# Patient Record
Sex: MALE | Race: ASIAN | Employment: OTHER | URBAN - METROPOLITAN AREA
[De-identification: names, ages, dates, MRNs, and addresses within clinical notes are randomized per-mention and may not be internally consistent; named-entity substitution may affect disease eponyms.]

---

## 2017-03-08 ENCOUNTER — APPOINTMENT (OUTPATIENT)
Dept: CT IMAGING | Age: 62
End: 2017-03-08
Attending: PHYSICIAN ASSISTANT
Payer: COMMERCIAL

## 2017-03-08 ENCOUNTER — HOSPITAL ENCOUNTER (EMERGENCY)
Age: 62
Discharge: HOME | End: 2017-03-08
Attending: EMERGENCY MEDICINE
Payer: COMMERCIAL

## 2017-03-08 VITALS
SYSTOLIC BLOOD PRESSURE: 120 MMHG | HEART RATE: 78 BPM | RESPIRATION RATE: 18 BRPM | OXYGEN SATURATION: 98 % | DIASTOLIC BLOOD PRESSURE: 68 MMHG | HEIGHT: 68 IN | BODY MASS INDEX: 27.13 KG/M2 | TEMPERATURE: 97.8 F | WEIGHT: 179 LBS

## 2017-03-08 DIAGNOSIS — N30.90 CYSTITIS: Primary | ICD-10-CM

## 2017-03-08 LAB
ALBUMIN SERPL BCP-MCNC: 3.8 G/DL (ref 3.4–5)
ALBUMIN/GLOB SERPL: 0.9 {RATIO} (ref 0.8–1.7)
ALP SERPL-CCNC: 76 U/L (ref 45–117)
ALT SERPL-CCNC: 104 U/L (ref 16–61)
ANION GAP BLD CALC-SCNC: 7 MMOL/L (ref 3–18)
APPEARANCE UR: ABNORMAL
AST SERPL W P-5'-P-CCNC: 60 U/L (ref 15–37)
BACTERIA URNS QL MICRO: ABNORMAL /HPF
BASOPHILS # BLD AUTO: 0.1 K/UL (ref 0–0.06)
BASOPHILS # BLD: 1 % (ref 0–2)
BILIRUB SERPL-MCNC: 0.6 MG/DL (ref 0.2–1)
BILIRUB UR QL: ABNORMAL
BUN SERPL-MCNC: 9 MG/DL (ref 7–18)
BUN/CREAT SERPL: 7 (ref 12–20)
CALCIUM SERPL-MCNC: 9.5 MG/DL (ref 8.5–10.1)
CHLORIDE SERPL-SCNC: 86 MMOL/L (ref 100–108)
CO2 SERPL-SCNC: 34 MMOL/L (ref 21–32)
COLOR UR: ABNORMAL
CREAT SERPL-MCNC: 1.22 MG/DL (ref 0.6–1.3)
DIFFERENTIAL METHOD BLD: ABNORMAL
EOSINOPHIL # BLD: 0.2 K/UL (ref 0–0.4)
EOSINOPHIL NFR BLD: 2 % (ref 0–5)
EPITH CASTS URNS QL MICRO: ABNORMAL /LPF (ref 0–5)
ERYTHROCYTE [DISTWIDTH] IN BLOOD BY AUTOMATED COUNT: 12.7 % (ref 11.6–14.5)
GLOBULIN SER CALC-MCNC: 4.3 G/DL (ref 2–4)
GLUCOSE SERPL-MCNC: 274 MG/DL (ref 74–99)
GLUCOSE UR STRIP.AUTO-MCNC: >1000 MG/DL
HCT VFR BLD AUTO: 41.6 % (ref 36–48)
HGB BLD-MCNC: 14.5 G/DL (ref 13–16)
HGB UR QL STRIP: ABNORMAL
KETONES UR QL STRIP.AUTO: NEGATIVE MG/DL
LEUKOCYTE ESTERASE UR QL STRIP.AUTO: ABNORMAL
LYMPHOCYTES # BLD AUTO: 19 % (ref 21–52)
LYMPHOCYTES # BLD: 2.2 K/UL (ref 0.9–3.6)
MCH RBC QN AUTO: 30.9 PG (ref 24–34)
MCHC RBC AUTO-ENTMCNC: 34.9 G/DL (ref 31–37)
MCV RBC AUTO: 88.7 FL (ref 74–97)
MONOCYTES # BLD: 1.3 K/UL (ref 0.05–1.2)
MONOCYTES NFR BLD AUTO: 12 % (ref 3–10)
NEUTS SEG # BLD: 7.7 K/UL (ref 1.8–8)
NEUTS SEG NFR BLD AUTO: 66 % (ref 40–73)
NITRITE UR QL STRIP.AUTO: NEGATIVE
PH UR STRIP: 6.5 [PH] (ref 5–8)
PLATELET # BLD AUTO: 245 K/UL (ref 135–420)
PMV BLD AUTO: 9.7 FL (ref 9.2–11.8)
POTASSIUM SERPL-SCNC: 3.7 MMOL/L (ref 3.5–5.5)
PROT SERPL-MCNC: 8.1 G/DL (ref 6.4–8.2)
PROT UR STRIP-MCNC: 300 MG/DL
RBC # BLD AUTO: 4.69 M/UL (ref 4.7–5.5)
RBC #/AREA URNS HPF: ABNORMAL /HPF (ref 0–5)
SODIUM SERPL-SCNC: 127 MMOL/L (ref 136–145)
SP GR UR REFRACTOMETRY: 1.02 (ref 1–1.03)
UROBILINOGEN UR QL STRIP.AUTO: 1 EU/DL (ref 0.2–1)
WBC # BLD AUTO: 11.4 K/UL (ref 4.6–13.2)
WBC URNS QL MICRO: ABNORMAL /HPF (ref 0–4)

## 2017-03-08 PROCEDURE — 99283 EMERGENCY DEPT VISIT LOW MDM: CPT

## 2017-03-08 PROCEDURE — 74176 CT ABD & PELVIS W/O CONTRAST: CPT

## 2017-03-08 PROCEDURE — 80053 COMPREHEN METABOLIC PANEL: CPT | Performed by: PHYSICIAN ASSISTANT

## 2017-03-08 PROCEDURE — 81001 URINALYSIS AUTO W/SCOPE: CPT | Performed by: EMERGENCY MEDICINE

## 2017-03-08 PROCEDURE — 85025 COMPLETE CBC W/AUTO DIFF WBC: CPT | Performed by: PHYSICIAN ASSISTANT

## 2017-03-08 RX ORDER — LEVOFLOXACIN 750 MG/1
750 TABLET ORAL DAILY
Qty: 7 TAB | Refills: 0 | Status: SHIPPED | OUTPATIENT
Start: 2017-03-08 | End: 2017-03-15

## 2017-03-08 RX ORDER — HYDROCODONE BITARTRATE AND ACETAMINOPHEN 5; 325 MG/1; MG/1
1 TABLET ORAL
Qty: 20 TAB | Refills: 0 | Status: SHIPPED | OUTPATIENT
Start: 2017-03-08 | End: 2017-03-08

## 2017-03-08 RX ORDER — LEVOFLOXACIN 750 MG/1
750 TABLET ORAL DAILY
Qty: 7 TAB | Refills: 0 | Status: SHIPPED | OUTPATIENT
Start: 2017-03-08 | End: 2017-03-08

## 2017-03-08 NOTE — ED NOTES
Current Discharge Medication List      START taking these medications    Details   levoFLOXacin (LEVAQUIN) 750 mg tablet Take 1 Tab by mouth daily for 7 days. Qty: 7 Tab, Refills: 0         CONTINUE these medications which have NOT CHANGED    Details   tamsulosin (FLOMAX) 0.4 mg capsule Refills: 0      VITAMIN D2 50,000 unit capsule Refills: 0      ibuprofen (MOTRIN) 800 mg tablet Refills: 0      indapamide (LOZOL) 2.5 mg tablet Refills: 0      LEVEMIR FLEXTOUCH 100 unit/mL (3 mL) inpn Refills: 0      metFORMIN SR (FORTAMET) tablet Refills: 0      folic acid-vit K2-JOSE W81 2.5-25-1 mg tablet Take 1 Tab by mouth daily. amLODIPine (NORVASC) 10 mg tablet Take  by mouth daily. insulin lispro (HUMALOG) 100 unit/mL kwikpen by SubCUTAneous route. aspirin 81 mg chewable tablet Take 81 mg by mouth daily. olmesartan (BENICAR) 40 mg tablet Take 40 mg by mouth daily. MULTIVITAMIN PO Take  by mouth. DOCOSAHEXANOIC ACID/EPA (FISH OIL PO) Take  by mouth.            Patient armband removed and shredded  Prescription given and reviewed with patient

## 2017-03-08 NOTE — DISCHARGE INSTRUCTIONS

## 2017-03-08 NOTE — ED PROVIDER NOTES
HPI Comments: Pt is 60yo male presenting to ER with dysuria x5 days. States pain is only present while urinating. No pain before or after urinating. Denies abdominal pain, hematuria, back pain. Denies fever, chills, NVD, penile discharge. Hx of similar in past, states symptoms improved with abx use. Khanh Ryaa MD PCP. Past Medical History:  No date: Allergic rhinitis  No date: Atherosclerotic PVD with intermittent claudica*  No date: Chest pain, unspecified  No date: Diabetes mellitus type 2, uncontrolled, withou*  No date: Essential hypertension, benign  No date: Hyperlipidemia  No date: Vitamin D deficiency     Patient is a 60 y.o. male presenting with urinary pain and abdominal pain. The history is provided by the patient. Urinary Pain    Associated symptoms include abdominal pain. Pertinent negatives include no chills, no nausea, no vomiting, no frequency, no hematuria, no urgency, no flank pain and no back pain. Abdominal Pain    Associated symptoms include dysuria. Pertinent negatives include no fever, no diarrhea, no nausea, no vomiting, no frequency, no hematuria, no headaches, no arthralgias, no testicular pain and no back pain. Past Medical History:   Diagnosis Date    Allergic rhinitis     Atherosclerotic PVD with intermittent claudication (HCC)     Chest pain, unspecified     Diabetes mellitus type 2, uncontrolled, without complications (HCC)     Essential hypertension, benign     Hyperlipidemia     Vitamin D deficiency        History reviewed. No pertinent surgical history. Family History:   Problem Relation Age of Onset    Heart Disease Mother        Social History     Social History    Marital status:      Spouse name: N/A    Number of children: N/A    Years of education: N/A     Occupational History    Not on file.      Social History Main Topics    Smoking status: Never Smoker    Smokeless tobacco: Never Used    Alcohol use Yes      Comment: occaisonally    Drug use: No    Sexual activity: Not Currently     Other Topics Concern    Not on file     Social History Narrative    ** Merged History Encounter **              ALLERGIES: Review of patient's allergies indicates no known allergies. Review of Systems   Constitutional: Negative for chills and fever. Eyes: Negative. Gastrointestinal: Positive for abdominal pain. Negative for diarrhea, nausea and vomiting. Endocrine: Negative. Genitourinary: Positive for dysuria. Negative for discharge, flank pain, frequency, hematuria, penile pain, penile swelling, scrotal swelling, testicular pain and urgency. Musculoskeletal: Negative for arthralgias and back pain. Neurological: Negative for dizziness, weakness, numbness and headaches. Vitals:    03/08/17 1447   BP: 120/68   Pulse: 78   Resp: 18   Temp: 97.8 °F (36.6 °C)   SpO2: 98%   Weight: 81.2 kg (179 lb)   Height: 5' 8\" (1.727 m)            Physical Exam   Constitutional: He is oriented to person, place, and time. He appears well-developed and well-nourished. No distress. HENT:   Head: Normocephalic and atraumatic. Mouth/Throat: Oropharynx is clear and moist.   Eyes: Conjunctivae are normal.   Neck: Normal range of motion. Cardiovascular: Normal rate, regular rhythm and normal heart sounds. Pulmonary/Chest: Effort normal. No respiratory distress. He has no wheezes. He has no rales. He exhibits no tenderness. Abdominal: Soft. Bowel sounds are normal. He exhibits no distension and no mass. There is no tenderness. There is no rebound and no guarding. Mild suprapubic tenderness   Genitourinary: Penis normal.   Neurological: He is oriented to person, place, and time. No cranial nerve deficit. Coordination normal.   Skin: Skin is warm and dry. He is not diaphoretic. Nursing note and vitals reviewed.        MDM  Number of Diagnoses or Management Options  Cystitis:   Diagnosis management comments: UA: large amount of blood, moderate leuks, 11-20 WBC,     CT abdomen/pelvis:   Apparent eccentric abnormal urinary bladder wall thickening on the right  inferiorly. Differential diagnosis of cystitis vs. bladder wall carcinoma. Further evaluation would be recommended. 1 mm right intrarenal nonobstructing calculus. Small umbilical hernia contains only fat. Atherosclerosis. Hepatic steatosis with top normal hepatic size. Labs reasurring    Rechecked the patient and updated him on all current results. Based upon the patients presentation with noted HPI and PE, along with the work up done in the emergency department today, I believe the patient's symptoms are a result of cystitis. Will Rx for levaquin as he has contraindication for bactrim and another daily med. Pt instructed to f/u with PCP within 1 week and return to ED if worse or as needed.       ED Course       Procedures

## 2017-09-21 ENCOUNTER — OFFICE VISIT (OUTPATIENT)
Dept: CARDIOLOGY CLINIC | Age: 62
End: 2017-09-21

## 2017-09-21 VITALS
HEIGHT: 68 IN | HEART RATE: 69 BPM | WEIGHT: 191 LBS | BODY MASS INDEX: 28.95 KG/M2 | SYSTOLIC BLOOD PRESSURE: 109 MMHG | DIASTOLIC BLOOD PRESSURE: 56 MMHG

## 2017-09-21 DIAGNOSIS — Z79.4 TYPE 2 DIABETES MELLITUS WITHOUT COMPLICATION, WITH LONG-TERM CURRENT USE OF INSULIN (HCC): ICD-10-CM

## 2017-09-21 DIAGNOSIS — R07.9 CHEST PAIN, UNSPECIFIED TYPE: Primary | ICD-10-CM

## 2017-09-21 DIAGNOSIS — E11.9 TYPE 2 DIABETES MELLITUS WITHOUT COMPLICATION, WITH LONG-TERM CURRENT USE OF INSULIN (HCC): ICD-10-CM

## 2017-09-21 DIAGNOSIS — I10 ESSENTIAL HYPERTENSION: ICD-10-CM

## 2017-09-21 DIAGNOSIS — R42 DIZZINESS: ICD-10-CM

## 2017-09-21 DIAGNOSIS — R06.02 SOB (SHORTNESS OF BREATH) ON EXERTION: ICD-10-CM

## 2017-09-21 RX ORDER — MULTIVITAMIN
1000 TABLET ORAL
COMMUNITY
End: 2020-05-21

## 2017-09-21 NOTE — LETTER
Boni Holguin 1955 9/21/2017 Dear Mica Antonio MD 
 
I had the pleasure of evaluating  Mr. Holley Palomino in office today. Below are the relevant portions of my assessment and plan of care. ICD-10-CM ICD-9-CM 1. Chest pain, unspecified type R07.9 786.50 SCHEDULE NUCLEAR STUDY  
   2D ECHO COMPLETE ADULT (TTE)  
 possible angina,chest wall 2. Essential hypertension I10 401.9 SCHEDULE NUCLEAR STUDY  
   2D ECHO COMPLETE ADULT (TTE) 3. Type 2 diabetes mellitus without complication, with long-term current use of insulin (HCC) E11.9 250.00   
 Z79.4 V58.67   
4. SOB (shortness of breath) on exertion R06.02 786.05   
 ? hcvd,chf  
5. Dizziness R42 780.4   
 likely postural hypotension Likely from diabetic neuropathy Current Outpatient Prescriptions Medication Sig Dispense Refill  cinnamon bark (CINNAMON) 500 mg cap Take 1,000 mg by mouth.  tamsulosin (FLOMAX) 0.4 mg capsule 0.4 mg nightly. 0  
 VITAMIN D2 50,000 unit capsule   0  
 ibuprofen (MOTRIN) 800 mg tablet   0  
 indapamide (LOZOL) 2.5 mg tablet 2.5 mg daily. 0  
 LEVEMIR FLEXTOUCH 100 unit/mL (3 mL) inpn 4 Units daily. 0  
 metFORMIN SR (FORTAMET) tablet 1,000 mg two (2) times a day.  0  
 folic acid-vit Q3-MQV U71 2.5-25-1 mg tablet Take 1 Tab by mouth daily.  amLODIPine (NORVASC) 10 mg tablet Take 10 mg by mouth daily.  insulin lispro (HUMALOG) 100 unit/mL kwikpen 6 Units by SubCUTAneous route three (3) times daily.  aspirin 81 mg chewable tablet Take 81 mg by mouth daily.  olmesartan (BENICAR) 40 mg tablet Take 40 mg by mouth daily.  MULTIVITAMIN PO Take  by mouth.  DOCOSAHEXANOIC ACID/EPA (FISH OIL PO) Take  by mouth. Orders Placed This Encounter  SCHEDULE NUCLEAR STUDY Exercise stress test  
  Standing Status:   Future Standing Expiration Date:   9/21/2018  2D ECHO COMPLETE ADULT (TTE) Standing Status:   Future Standing Expiration Date:   3/20/2018 Order Specific Question:   Reason for Exam: Answer:   see diagnosis  cinnamon bark (CINNAMON) 500 mg cap Sig: Take 1,000 mg by mouth. If you have questions, please do not hesitate to call me. I look forward to following Mr. Yamila Rocha along with you. Sincerely, Sharla Jacinto MD

## 2017-09-21 NOTE — MR AVS SNAPSHOT
Visit Information Date & Time Provider Department Dept. Phone Encounter #  
 9/21/2017  9:15 AM Dorian Mccabe MD [facility] [address] [place] [address] [id number] Follow-up Instructions Return for F/u after tests. Your Appointments 9/27/2017  7:30 AM  
PROCEDURE with [place] NUC [facility] ([address]) Appt Note: figueroa with Echo if possible wt [address] [facility], [address] [facility] [address]  
[address], [address] [address]  
  
    
 10/20/2017  8:30 AM  
PROCEDURE with CA ECHO [facility] ([address]) Appt Note: with same day f/u  
 [address] [facility], [address] [place] [address]  
[address], [address] [address]  
  
    
 10/20/2017  1:45 PM  
ESTABLISHED PATIENT with Dorian Mccabe MD  
[facility] Appt Note: post nuc and echo [address] [facility], [address] [facility] [address]  
[address], [address] [address] Upcoming Health Maintenance Date Due Hepatitis C Screening 1955 DTaP/Tdap/Td series (1 - Tdap) 1/11/1975 FOBT Q 1 YEAR AGE 50-75 1/11/2004 ZOSTER VACCINE AGE 60> 11/11/2013 INFLUENZA AGE 9 TO ADULT 8/1/2017 Allergies as of 9/21/2017  Review Complete On: 9/21/2017 By: Dorian Mccabe MD  
 No Known Allergies Current Immunizations  Never Reviewed No immunizations on file. Not reviewed this visit You Were Diagnosed With   
  
 Codes Comments Chest pain, unspecified type    -  Primary ICD-10-CM: R07.9 ICD-9-CM: 786.50 possible angina,chest wall Essential hypertension     ICD-10-CM: I10 
ICD-9-CM: 401.9 Type 2 diabetes mellitus without complication, with long-term current use of insulin (HCC)     ICD-10-CM: E11.9, Z79.4 ICD-9-CM: 250.00, V58.67   
 SOB (shortness of breath) on exertion     ICD-10-CM: R06.02 
ICD-9-CM: 786.05 ? hcvd,chf  
 Dizziness     ICD-10-CM: D32 ICD-9-CM: 780.4 likely postural hypotension Likely from diabetic neuropathy Vitals BP Pulse Height(growth percentile) Weight(growth percentile) BMI Smoking Status 109/56 69 5' 8\" (1.727 m) 191 lb (86.6 kg) 29.04 kg/m2 Never Smoker Vitals History BMI and BSA Data Body Mass Index Body Surface Area 29.04 kg/m 2 2.04 m 2 Preferred Pharmacy Pharmacy Name Phone [facility] [address] , [facility] [place] [address] [phone] Your Updated Medication List  
  
   
This list is accurate as of: 9/21/17 10:16 AM.  Always use your most recent med list. amLODIPine 10 mg tablet Commonly known as:  Jacquetta Couch Take 10 mg by mouth daily. aspirin 81 mg chewable tablet Take 81 mg by mouth daily. BENICAR 40 mg tablet Generic drug:  olmesartan Take 40 mg by mouth daily. CINNAMON 500 mg Cap Generic drug:  cinnamon bark Take 1,000 mg by mouth. FISH OIL PO Take  by mouth. folic acid-vit X9-UCB I82 2.5-25-1 mg tablet Commonly known as:  Danney Taylor Take 1 Tab by mouth daily. ibuprofen 800 mg tablet Commonly known as:  MOTRIN  
  
 indapamide 2.5 mg tablet Commonly known as:  LOZOL  
2.5 mg daily. insulin lispro 100 unit/mL kwikpen Commonly known as:  HUMALOG  
6 Units by SubCUTAneous route three (3) times daily. LEVEMIR FLEXTOUCH 100 unit/mL (3 mL) Inpn Generic drug:  insulin detemir 4 Units daily. metFORMIN  mg  
Commonly known as:  FORTAMET  
1,000 mg two (2) times a day. MULTIVITAMIN PO Take  by mouth. tamsulosin 0.4 mg capsule Commonly known as:  FLOMAX  
0.4 mg nightly. VITAMIN D2 50,000 unit capsule Generic drug:  ergocalciferol Follow-up Instructions Return for F/u after tests. To-Do List   
 09/24/2017 Cardiac Services:  2D ECHO COMPLETE ADULT (TTE)   
  
 09/28/2017 Nursing:  SCHEDULE NUCLEAR STUDY Introducing [facility]! Namrata Kennedy introduces [employer] patient portal. Now you can access parts of your medical record, email your doctor's office, and request medication refills online. 1. In your internet browser, go to [email or web address] 2. Click on the First Time User? Click Here link in the Sign In box. You will see the New Member Sign Up page. 3. Enter your [employer] Access Code exactly as it appears below. You will not need to use this code after youve completed the sign-up process. If you do not sign up before the expiration date, you must request a new code. · [employer] Access Code: [email or web address] Expires: 12/20/2017  9:39 AM 
 
4. Enter the last four digits of your Social Security Number (xxxx) and Date of Birth (mm/dd/yyyy) as indicated and click Submit. You will be taken to the next sign-up page. 5. Create a [employer] ID. This will be your [employer] login ID and cannot be changed, so think of one that is secure and easy to remember. 6. Create a [employer] password. You can change your password at any time. 7. Enter your Password Reset Question and Answer. This can be used at a later time if you forget your password. 8. Enter your e-mail address. You will receive e-mail notification when new information is available in [address]. 9. Click Sign Up. You can now view and download portions of your medical record. 10. Click the Download Summary menu link to download a portable copy of your medical information. If you have questions, please visit the Frequently Asked Questions section of the [employer] website. Remember, [employer] is NOT to be used for urgent needs. For medical emergencies, dial 911. Now available from your iPhone and Android! Please provide this summary of care documentation to your next provider. Your primary care clinician is listed as [address] [place] [address]. If you have any questions after today's visit, please call [phone].

## 2017-09-21 NOTE — PROGRESS NOTES
HISTORY OF PRESENT ILLNESS  Nalini Zamora is a 60 y.o. male. New Patient   The history is provided by the patient. This is a new problem. Associated symptoms include chest pain and shortness of breath. Pertinent negatives include no abdominal pain and no headaches. Chest Pain    The history is provided by the patient. This is a recurrent problem. The problem has not changed since onset. The problem occurs daily. The pain is associated with exertion and rest. The pain is present in the substernal region. The pain is mild. The quality of the pain is described as pressure-like and dull. The pain does not radiate. Associated symptoms include shortness of breath. Pertinent negatives include no abdominal pain, no claudication, no cough, no dizziness, no fever, no headaches, no hemoptysis, no nausea, no orthopnea, no palpitations, no PND, no sputum production, no vomiting and no weakness. Shortness of Breath   The history is provided by the patient. This is a recurrent problem. The problem occurs intermittently. The problem has not changed since onset. Associated symptoms include chest pain. Pertinent negatives include no fever, no headaches, no cough, no sputum production, no hemoptysis, no wheezing, no PND, no orthopnea, no vomiting, no abdominal pain, no rash, no leg swelling and no claudication. The problem's precipitants include exercise. Review of Systems   Constitutional: Negative for chills and fever. HENT: Negative for nosebleeds. Eyes: Negative for blurred vision and double vision. Respiratory: Positive for shortness of breath. Negative for cough, hemoptysis, sputum production and wheezing. Cardiovascular: Positive for chest pain. Negative for palpitations, orthopnea, claudication, leg swelling and PND. Gastrointestinal: Negative for abdominal pain, heartburn, nausea and vomiting. Musculoskeletal: Negative for myalgias. Skin: Negative for rash.    Neurological: Negative for dizziness, weakness and headaches. Endo/Heme/Allergies: Does not bruise/bleed easily. Family History   Problem Relation Age of Onset    Heart Disease Mother        Past Medical History:   Diagnosis Date    Allergic rhinitis     Atherosclerotic PVD with intermittent claudication (HCC)     Chest pain, unspecified     Diabetes mellitus type 2, uncontrolled, without complications ([facility] [address]     Essential hypertension, benign     Hyperlipidemia     Phimosis/redundant prepuce     Vitamin D deficiency        Past Surgical History:   Procedure Laterality Date    HX UROLOGICAL  03/31/2017    tonkin, sngh, Circumcision. Social History   Substance Use Topics    Smoking status: Never Smoker    Smokeless tobacco: Never Used    Alcohol use Yes      Comment: occasionally       No Known Allergies    Prior to Admission medications    Medication Sig Start Date End Date Taking? Authorizing Provider   cinnamon bark (CINNAMON) 500 mg cap Take 1,000 mg by mouth. Yes Historical Provider   tamsulosin (FLOMAX) 0.4 mg capsule 0.4 mg nightly. 12/2/16  Yes Historical Provider   VITAMIN D2 50,000 unit capsule  1/24/17  Yes Historical Provider   ibuprofen (MOTRIN) 800 mg tablet  12/18/16  Yes Historical Provider   indapamide (LOZOL) 2.5 mg tablet 2.5 mg daily. 2/24/17  Yes Historical Provider   LEVEMIR FLEXTOUCH 100 unit/mL (3 mL) inpn 4 Units daily. 2/24/17  Yes Historical Provider   metFORMIN SR (FORTAMET) tablet 1,000 mg two (2) times a day. 1/16/17  Yes Historical Provider   folic acid-vit U3-RGU J28 2.5-25-1 mg tablet Take 1 Tab by mouth daily. Yes Historical Provider   amLODIPine (NORVASC) 10 mg tablet Take 10 mg by mouth daily. Yes Historical Provider   insulin lispro (HUMALOG) 100 unit/mL kwikpen 6 Units by SubCUTAneous route three (3) times daily. Yes Historical Provider   aspirin 81 mg chewable tablet Take 81 mg by mouth daily. Yes Lidia Martin MD   olmesartan (BENICAR) 40 mg tablet Take 40 mg by mouth daily.    Yes Lidia Martin MD   MULTIVITAMIN PO Take  by mouth. Yes Lidia Martin MD   DOCOSAHEXANOIC ACID/EPA (FISH OIL PO) Take  by mouth. Yes Lidia Martin MD         Visit Vitals    /56    Pulse 69    Ht 5' 8\" (1.727 m)    Wt 86.6 kg (191 lb)    BMI 29.04 kg/m2       Physical Exam   Constitutional: He is oriented to person, place, and time. He appears well-developed and well-nourished. HENT:   Head: Normocephalic and atraumatic. Eyes: Conjunctivae are normal.   Neck: Neck supple. No JVD present. No tracheal deviation present. No thyromegaly present. Cardiovascular: Normal rate, regular rhythm and normal heart sounds. Exam reveals no gallop and no friction rub. No murmur heard. Pulmonary/Chest: Breath sounds normal. No respiratory distress. He has no wheezes. He has no rales. He exhibits no tenderness. Abdominal: Soft. There is no tenderness. Musculoskeletal: He exhibits no edema. Neurological: He is alert and oriented to person, place, and time. Skin: Skin is warm and dry. Psychiatric: He has a normal mood and affect. Mr. Eliazar Ortega has a reminder for a \"due or due soon\" health maintenance. I have asked that he contact his primary care provider for follow-up on this health maintenance. Assessment         ICD-10-CM ICD-9-CM    1. Chest pain, unspecified type R07.9 786.50 SCHEDULE NUCLEAR STUDY      2D ECHO COMPLETE ADULT (TTE)    possible angina,chest wall   2. Essential hypertension I10 401.9 SCHEDULE NUCLEAR STUDY      2D ECHO COMPLETE ADULT (TTE)   3. Type 2 diabetes mellitus without complication, with long-term current use of insulin (HCC) E11.9 250.00     Z79.4 V58.67    4. SOB (shortness of breath) on exertion R06.02 786.05     ? hcvd,chf   5. Dizziness R42 780.4     likely postural hypotension  Likely from diabetic neuropathy       There are no discontinued medications.     Orders Placed This Encounter    SCHEDULE NUCLEAR STUDY     Exercise stress test     Standing Status: Future     Standing Expiration Date:   9/21/2018    2D ECHO COMPLETE ADULT (TTE)     Standing Status:   Future     Standing Expiration Date:   3/20/2018     Order Specific Question:   Reason for Exam:     Answer:   see diagnosis       Follow-up Disposition:  Return for F/u after tests.

## 2017-09-27 ENCOUNTER — CLINICAL SUPPORT (OUTPATIENT)
Dept: CARDIOLOGY CLINIC | Age: 62
End: 2017-09-27

## 2017-09-27 DIAGNOSIS — R06.02 SHORTNESS OF BREATH: ICD-10-CM

## 2017-09-27 DIAGNOSIS — I10 ESSENTIAL HYPERTENSION, MALIGNANT: ICD-10-CM

## 2017-09-27 DIAGNOSIS — I10 ESSENTIAL HYPERTENSION: ICD-10-CM

## 2017-09-27 DIAGNOSIS — R07.9 CHEST PAIN, UNSPECIFIED TYPE: Primary | ICD-10-CM

## 2017-09-27 DIAGNOSIS — R07.9 CHEST PAIN, UNSPECIFIED TYPE: ICD-10-CM

## 2017-09-27 DIAGNOSIS — R42 DIZZINESS AND GIDDINESS: ICD-10-CM

## 2017-09-27 NOTE — PROGRESS NOTES
[facility]  [address], [address] [place] [address], [place], [address] [place] [address]  [phone] [place] [phone]      Name: Teresa Barajas         MRN#: [id number]      YOB: 1955     Gender: male Ht:5'8 \" Wt:191 lbs            Date of Rest/Stress Images: 9/27/2017   Referring Provider: Yosvany Garner MD  Ordering Provider: Delene Lesch. Rasta Alas MD, [facility]  Technologist: Petros Snato. Stevie ARRIOLA, C.N.M.T. Diagnosis:   1. Chest pain, unspecified type    2. Shortness of breath    3. Dizziness and giddiness    4. Essential hypertension, malignant          Rest/Stress Myoview SPECT Myocardial Perfusion Imaging with  Exercise Stress and Gated SPECT Imaging      PROCEDURE:      Myocardial perfusion imaging was performed at rest approximately 30 mins following the intravenous injection,(Right hand ) of 12.2 mCi of Tc99m Myoview for evaluation of myocardial function and perfusion at rest.     Baseline Data:    Baseline EKG reveals sinus rhythm, leftward axis, otherwise no acute ST-T changes. Baseline heart rate is 68. Baseline blood pressure is 116/68. Procedure: The patient was exercised using the standard Cornelius protocol for 6 minutes, 45 seconds. Exercise was stopped because of fatigue. The patient had no chest pain. Heart rate increased from baseline to a heart rate of 130, achieving 83% of the maximum predicted heart rate. Blood pressure response was appropriate. Peak blood pressure was 140/56. Electrocardiogram showed no significant ST-T changes or arrhythmia during the procedure. Diagnosis:   1. Negative EKG portion of exercise stress test at 83% of the maximum predicted heart rate. 2. Fair exercise tolerance. 3. No symptoms of angina. 4. Nuclear imaging report to follow. Exercise:   At peak exercise, the patient was injected intravenously with 38.3 mCi of Tc99m Myoview and exercise was continued for 15 to 45 seconds. The stress images were obtained approximately 30 minutes post tracer injection. The stress SPECT study was gated to evaluate regional wall motion and calculate the left ventricular ejection fraction. The data was reconstructed in the short, horizontal long and vertical long axis views and tomographic slices were generated. NUCLEAR IMAGING:    Findings:   1. Stress images reveal normal Myoview distrubution in all the LV segments in short axis, vertical and horizontal long axis views. 2. Resting images have a normal uptake. 3. Gated images reveal normal wall motion and the ejection fraction is calculated to be 58%. Conclusion:   1. Normal perfusion scan. 2. Normal wall motion and ejection fraction. 3. No evidence of significant fixed or reversible defect suggesting ischemia or myocardial infarction noted from this nuclear study. 4. Low risk scan. Thank you for the referral.    E-signed and Interpreting Physician:    Lorraine Ernst.  Verito Rocha MD, [facility]     Date of interpretation: 9/27/2017  Date of final report: 9/27/2017

## 2017-10-19 ENCOUNTER — OFFICE VISIT (OUTPATIENT)
Dept: CARDIOLOGY CLINIC | Age: 62
End: 2017-10-19

## 2017-10-19 VITALS
DIASTOLIC BLOOD PRESSURE: 68 MMHG | BODY MASS INDEX: 28.95 KG/M2 | HEIGHT: 68 IN | HEART RATE: 90 BPM | SYSTOLIC BLOOD PRESSURE: 133 MMHG | WEIGHT: 191 LBS

## 2017-10-19 DIAGNOSIS — I34.0 NON-RHEUMATIC MITRAL REGURGITATION: ICD-10-CM

## 2017-10-19 DIAGNOSIS — R07.9 CHEST PAIN, UNSPECIFIED TYPE: Primary | ICD-10-CM

## 2017-10-19 DIAGNOSIS — I10 ESSENTIAL HYPERTENSION: ICD-10-CM

## 2017-10-19 NOTE — LETTER
Huber Brittany 1955 
 
10/19/2017 Dear 106 Marcio Garner MD 
 
I had the pleasure of evaluating  Mr. Prabhakar Jeffery in office today. Below are the relevant portions of my assessment and plan of care. ICD-10-CM ICD-9-CM 1. Chest pain, unspecified type R07.9 786.50   
 better 
negative stress test 
monitor 2. Essential hypertension I10 401.9   
 controlled 3. Non-rheumatic mitral regurgitation I34.0 424.0   
 mild mr 
asymptomatic Current Outpatient Prescriptions Medication Sig Dispense Refill  cinnamon bark (CINNAMON) 500 mg cap Take 1,000 mg by mouth.  tamsulosin (FLOMAX) 0.4 mg capsule 0.4 mg nightly. 0  
 VITAMIN D2 50,000 unit capsule   0  
 ibuprofen (MOTRIN) 800 mg tablet   0  
 indapamide (LOZOL) 2.5 mg tablet 2.5 mg daily. 0  
 LEVEMIR FLEXTOUCH 100 unit/mL (3 mL) inpn 4 Units daily. 0  
 metFORMIN SR (FORTAMET) tablet 1,000 mg two (2) times a day.  0  
 folic acid-vit K5-NGS S86 2.5-25-1 mg tablet Take 1 Tab by mouth daily.  amLODIPine (NORVASC) 10 mg tablet Take 10 mg by mouth daily.  insulin lispro (HUMALOG) 100 unit/mL kwikpen 6 Units by SubCUTAneous route three (3) times daily.  aspirin 81 mg chewable tablet Take 81 mg by mouth daily.  olmesartan (BENICAR) 40 mg tablet Take 40 mg by mouth daily.  MULTIVITAMIN PO Take  by mouth.  DOCOSAHEXANOIC ACID/EPA (FISH OIL PO) Take  by mouth. No orders of the defined types were placed in this encounter. If you have questions, please do not hesitate to call me. I look forward to following Mr. Prabhakar Jeffery along with you. Sincerely, Mayelin Garcia MD

## 2017-10-19 NOTE — PROGRESS NOTES
HISTORY OF PRESENT ILLNESS  Paco Ventura is a 60 y.o. male. Hypertension   The history is provided by the patient. This is a chronic problem. The problem occurs constantly. Pertinent negatives include no chest pain, no abdominal pain, no headaches and no shortness of breath. Chest Pain (Angina)    The history is provided by the patient. This is a recurrent problem. The problem has been rapidly improving. The problem occurs rarely. The pain is associated with exertion and rest. The pain is present in the substernal region. The pain is mild. The quality of the pain is described as pressure-like and dull. The pain does not radiate. Pertinent negatives include no abdominal pain, no claudication, no cough, no dizziness, no fever, no headaches, no hemoptysis, no nausea, no orthopnea, no palpitations, no PND, no shortness of breath, no sputum production, no vomiting and no weakness. Shortness of Breath   The history is provided by the patient. This is a recurrent problem. The problem occurs intermittently. The problem has not changed since onset. Pertinent negatives include no fever, no headaches, no cough, no sputum production, no hemoptysis, no wheezing, no PND, no orthopnea, no chest pain, no vomiting, no abdominal pain, no rash, no leg swelling and no claudication. The problem's precipitants include exercise. Review of Systems   Constitutional: Negative for chills and fever. HENT: Negative for nosebleeds. Eyes: Negative for blurred vision and double vision. Respiratory: Negative for cough, hemoptysis, sputum production, shortness of breath and wheezing. Cardiovascular: Negative for chest pain, palpitations, orthopnea, claudication, leg swelling and PND. Gastrointestinal: Negative for abdominal pain, heartburn, nausea and vomiting. Musculoskeletal: Negative for myalgias. Skin: Negative for rash. Neurological: Negative for dizziness, weakness and headaches.    Endo/Heme/Allergies: Does not bruise/bleed easily. Family History   Problem Relation Age of Onset    Heart Disease Mother        Past Medical History:   Diagnosis Date    Allergic rhinitis     Atherosclerotic PVD with intermittent claudication (HCC)     Chest pain, unspecified     Diabetes mellitus type 2, uncontrolled, without complications ([facility] [address]     Essential hypertension, benign     Hyperlipidemia     Phimosis/redundant prepuce     Vitamin D deficiency        Past Surgical History:   Procedure Laterality Date    HX UROLOGICAL  03/31/2017    tonkin, sngh, Circumcision. Social History   Substance Use Topics    Smoking status: Never Smoker    Smokeless tobacco: Never Used    Alcohol use Yes      Comment: occasionally       No Known Allergies    Prior to Admission medications    Medication Sig Start Date End Date Taking? Authorizing Provider   cinnamon bark (CINNAMON) 500 mg cap Take 1,000 mg by mouth. Yes Historical Provider   tamsulosin (FLOMAX) 0.4 mg capsule 0.4 mg nightly. 12/2/16  Yes Historical Provider   VITAMIN D2 50,000 unit capsule  1/24/17  Yes Historical Provider   ibuprofen (MOTRIN) 800 mg tablet  12/18/16  Yes Historical Provider   indapamide (LOZOL) 2.5 mg tablet 2.5 mg daily. 2/24/17  Yes Historical Provider   LEVEMIR FLEXTOUCH 100 unit/mL (3 mL) inpn 4 Units daily. 2/24/17  Yes Historical Provider   metFORMIN SR (FORTAMET) tablet 1,000 mg two (2) times a day. 1/16/17  Yes Historical Provider   folic acid-vit J3-HVN R36 2.5-25-1 mg tablet Take 1 Tab by mouth daily. Yes Historical Provider   amLODIPine (NORVASC) 10 mg tablet Take 10 mg by mouth daily. Yes Historical Provider   insulin lispro (HUMALOG) 100 unit/mL kwikpen 6 Units by SubCUTAneous route three (3) times daily. Yes Historical Provider   aspirin 81 mg chewable tablet Take 81 mg by mouth daily. Yes Lidia Martin MD   olmesartan (BENICAR) 40 mg tablet Take 40 mg by mouth daily. Yes Lidia Martin MD   MULTIVITAMIN PO Take  by mouth.    Yes Lidia Martin MD   DOCOSAHEXANOIC ACID/EPA (FISH OIL PO) Take  by mouth. Yes Lidia Martin MD         Visit Vitals    /68    Pulse 90    Ht 5' 8\" (1.727 m)    Wt 86.6 kg (191 lb)    BMI 29.04 kg/m2       Physical Exam   Constitutional: He is oriented to person, place, and time. He appears well-developed and well-nourished. HENT:   Head: Normocephalic and atraumatic. Eyes: Conjunctivae are normal.   Neck: Neck supple. No JVD present. No tracheal deviation present. No thyromegaly present. Cardiovascular: Normal rate, regular rhythm and normal heart sounds. Exam reveals no gallop and no friction rub. No murmur heard. Pulmonary/Chest: Breath sounds normal. No respiratory distress. He has no wheezes. He has no rales. He exhibits no tenderness. Abdominal: Soft. There is no tenderness. Musculoskeletal: He exhibits no edema. Neurological: He is alert and oriented to person, place, and time. Skin: Skin is warm and dry. Psychiatric: He has a normal mood and affect. Mr. Urbano Watson has a reminder for a \"due or due soon\" health maintenance. I have asked that he contact his primary care provider for follow-up on this health maintenance. SUMMARY:2017-echo  Left ventricle: Systolic function was normal. Ejection fraction was  estimated in the range of 55 % to 60 %. There were no regional wall motion  abnormalities. There was mild concentric hypertrophy. Doppler parameters  were consistent with abnormal left ventricular relaxation (grade 1  diastolic dysfunction). Mitral valve: There was mild regurgitation. NUCLEAR [id number]     Findings:   1. Stress images reveal normal Myoview distrubution in all the LV segments in short axis, vertical and horizontal long axis views. 2. Resting images have a normal uptake. 3. Gated images reveal normal wall motion and the ejection fraction is calculated to be 58%. Conclusion:   1. Normal perfusion scan.    2. Normal wall motion and ejection fraction. 3. No evidence of significant fixed or reversible defect suggesting ischemia or myocardial infarction noted from this nuclear study. 4. Low risk scan. Assessment         ICD-10-CM ICD-9-CM    1. Chest pain, unspecified type R07.9 786.50     better  negative stress test  monitor   2. Essential hypertension I10 401.9     controlled   3. Non-rheumatic mitral regurgitation I34.0 424.0     mild mr  asymptomatic       There are no discontinued medications. No orders of the defined types were placed in this encounter. Follow-up Disposition:  Return in about 1 year (around 10/19/2018).

## 2017-10-19 NOTE — PROGRESS NOTES
1. Have you been to the ER, urgent care clinic since your last visit? Hospitalized since your last visit? No    2. Have you seen or consulted any other health care providers outside of the [address] [place] [address] since your last visit? Include any pap smears or colon screening.  No

## 2018-06-25 ENCOUNTER — APPOINTMENT (OUTPATIENT)
Dept: CT IMAGING | Age: 63
End: 2018-06-25
Attending: EMERGENCY MEDICINE
Payer: COMMERCIAL

## 2018-06-25 ENCOUNTER — HOSPITAL ENCOUNTER (EMERGENCY)
Age: 63
Discharge: HOME | End: 2018-06-25
Attending: EMERGENCY MEDICINE
Payer: COMMERCIAL

## 2018-06-25 VITALS
BODY MASS INDEX: 27.28 KG/M2 | SYSTOLIC BLOOD PRESSURE: 169 MMHG | DIASTOLIC BLOOD PRESSURE: 84 MMHG | RESPIRATION RATE: 14 BRPM | WEIGHT: 180 LBS | OXYGEN SATURATION: 96 % | TEMPERATURE: 100.2 F | HEART RATE: 88 BPM | HEIGHT: 68 IN

## 2018-06-25 DIAGNOSIS — G44.1 OTHER VASCULAR HEADACHE: ICD-10-CM

## 2018-06-25 DIAGNOSIS — J02.9 PHARYNGITIS, UNSPECIFIED ETIOLOGY: Primary | ICD-10-CM

## 2018-06-25 PROCEDURE — 70450 CT HEAD/BRAIN W/O DYE: CPT

## 2018-06-25 PROCEDURE — 99282 EMERGENCY DEPT VISIT SF MDM: CPT

## 2018-06-25 PROCEDURE — 87081 CULTURE SCREEN ONLY: CPT | Performed by: EMERGENCY MEDICINE

## 2018-06-25 PROCEDURE — 87077 CULTURE AEROBIC IDENTIFY: CPT | Performed by: EMERGENCY MEDICINE

## 2018-06-25 RX ORDER — BUTALBITAL, ACETAMINOPHEN AND CAFFEINE 300; 40; 50 MG/1; MG/1; MG/1
1 CAPSULE ORAL
Qty: 15 CAP | Refills: 0 | Status: SHIPPED | OUTPATIENT
Start: 2018-06-25 | End: 2019-06-06

## 2018-06-25 RX ORDER — AMOXICILLIN 500 MG/1
500 TABLET, FILM COATED ORAL 3 TIMES DAILY
Qty: 30 TAB | Refills: 0 | Status: SHIPPED | OUTPATIENT
Start: 2018-06-25 | End: 2019-06-06

## 2018-06-25 NOTE — ED PROVIDER NOTES
EMERGENCY DEPARTMENT HISTORY AND PHYSICAL EXAM    9:56 AM      Date: 6/25/2018  Patient Name: Chaim Milan    History of Presenting Illness     Chief Complaint   Patient presents with    Headache    Fever    Sore Throat         History Provided By: Patient    Chief Complaint: Sore throat   Duration: 1 Weeks  Timing:  Constant  Location: throat  Quality: Aching  Severity: Moderate  Modifying Factors: exacerbated by swallowing   Associated Symptoms: headache, fever, muscle aches, chills       Additional History (Context): Chaim Milan is a 58 y.o. male with PMHx of HTN, diabetes, hyperlipidemia, and phimosis who presents with c/o moderate aching constant sore throat that started 1 week ago. Associated Sx include headache, fever, muscle aches, and chills. Pt expressed concerns of a possible aneurysm or neurological issue because he states he never had a headache like this before. Denies any further complaints or symptoms at the moment. PCP: Yosvany Garner MD    Current Outpatient Prescriptions   Medication Sig Dispense Refill    butalbital-acetaminophen-caff (FIORICET) -40 mg per capsule Take 1 Cap by mouth every six (6) hours as needed for Pain or Headache. Indications: TENSION-TYPE HEADACHE 15 Cap 0    amoxicillin 500 mg tab Take 500 mg by mouth three (3) times daily. 30 Tab 0    cinnamon bark (CINNAMON) 500 mg cap Take 1,000 mg by mouth.  tamsulosin (FLOMAX) 0.4 mg capsule 0.4 mg nightly. 0    VITAMIN D2 50,000 unit capsule   0    ibuprofen (MOTRIN) 800 mg tablet   0    indapamide (LOZOL) 2.5 mg tablet 2.5 mg daily. 0    LEVEMIR FLEXTOUCH 100 unit/mL (3 mL) inpn 4 Units daily. 0    metFORMIN SR (FORTAMET) tablet 1,000 mg two (2) times a day.  0    folic acid-vit G9-DXK V29 2.5-25-1 mg tablet Take 1 Tab by mouth daily.  amLODIPine (NORVASC) 10 mg tablet Take 10 mg by mouth daily.       insulin lispro (HUMALOG) 100 unit/mL kwikpen 6 Units by SubCUTAneous route three (3) times daily.  aspirin 81 mg chewable tablet Take 81 mg by mouth daily.  olmesartan (BENICAR) 40 mg tablet Take 40 mg by mouth daily.  MULTIVITAMIN PO Take  by mouth.  DOCOSAHEXANOIC ACID/EPA (FISH OIL PO) Take  by mouth. Past History     Past Medical History:  Past Medical History:   Diagnosis Date    Allergic rhinitis     Atherosclerotic PVD with intermittent claudication (HCC)     Chest pain, unspecified     Diabetes mellitus type 2, uncontrolled, without complications ([address]     Essential hypertension, benign     Hyperlipidemia     Phimosis/redundant prepuce     Vitamin D deficiency        Past Surgical History:  Past Surgical History:   Procedure Laterality Date    HX UROLOGICAL  03/31/2017    tonkin, sngh, Circumcision. Family History:  Family History   Problem Relation Age of Onset    Heart Disease Mother        Social History:  Social History   Substance Use Topics    Smoking status: Never Smoker    Smokeless tobacco: Never Used    Alcohol use Yes      Comment: occasionally       Allergies:  No Known Allergies      Review of Systems       Review of Systems   Constitutional: Positive for chills and fever. HENT: Positive for sore throat. Eyes: Negative. Respiratory: Negative. Cardiovascular: Negative. Gastrointestinal: Negative. Endocrine: Negative. Genitourinary: Negative. Musculoskeletal: Negative. Positive for muscle aches    Skin: Negative. Allergic/Immunologic: Negative. Neurological: Positive for headaches. Hematological: Negative. Psychiatric/Behavioral: Negative. All other systems reviewed and are negative.         Physical Exam     Visit Vitals    /84 (BP 1 Location: Left arm, BP Patient Position: Sitting)    Pulse 88    Temp 100.2 °F (37.9 °C)    Resp 14    Ht 5' 8\" (1.727 m)    Wt 81.6 kg (180 lb)    SpO2 96%    BMI 27.37 kg/m2         Physical Exam   Constitutional: He is oriented to person, place, and time. He appears well-developed and well-nourished. No distress. HENT:   Head: Normocephalic. Mouth/Throat: Posterior oropharyngeal erythema present. Eyes: Conjunctivae and EOM are normal. Pupils are equal, round, and reactive to light. Neck: Normal range of motion. Neck supple. Cardiovascular: Normal rate, regular rhythm, normal heart sounds and intact distal pulses. No murmur heard. Pulmonary/Chest: Effort normal and breath sounds normal. No respiratory distress. He has no wheezes. He has no rales. He exhibits no tenderness. Abdominal: Soft. Bowel sounds are normal. He exhibits no distension. There is no tenderness. There is no rebound. Musculoskeletal: Normal range of motion. He exhibits no edema or tenderness. Neurological: He is alert and oriented to person, place, and time. No cranial nerve deficit. He exhibits normal muscle tone. Coordination normal.   Skin: Skin is warm and dry. No rash noted. Psychiatric: He has a normal mood and affect. His behavior is normal. Judgment and thought content normal.   Nursing note and vitals reviewed. Diagnostic Study Results     Labs -  Recent Results (from the past 12 hour(s))   STREP THROAT SCREEN    Collection Time: 06/25/18  9:30 AM   Result Value Ref Range    Special Requests: NO SPECIAL REQUESTS      Strep Screen NEGATIVE       Culture result: PENDING        Radiologic Studies -   CT HEAD WO CONT   Final Result      Ct Head Wo Cont  Result Date: 6/25/2018  IMPRESSION:  1. No acute intracranial process. 2.  Mild sequela of chronic microvascular ischemic disease and mild generalized volume loss. Medical Decision Making   I am the first provider for this patient. I reviewed the vital signs, available nursing notes, past medical history, past surgical history, family history and social history. Vital Signs-Reviewed the patient's vital signs.     Pulse Oximetry Analysis -  96% on room air (Interpretation) normal    Records Reviewed: Nursing Notes (Time of Review: 9:56 AM)    ED Course: Progress Notes, Reevaluation, and Consults:  Patient remained stable. Provider Notes (Medical Decision Making): viral syndrome, anxiety, pharyngitis, migraine, and etc.    Diagnosis     Clinical Impression:   1. Pharyngitis, unspecified etiology    2. Other vascular headache        Disposition: discharged     Follow-up Information     Follow up With Details Comments [address], [place] Schedule an appointment as soon as possible for a visit For follow-up [address] [facility] [address]      [address] [facility] EMERGENCY DEPT Go to As needed, If symptoms worsen [address] [facility]  [phone]           Patient's Medications   Start Taking    AMOXICILLIN 500 MG TAB    Take 500 mg by mouth three (3) times daily. BUTALBITAL-ACETAMINOPHEN-CAFF (FIORICET) -40 MG PER CAPSULE    Take 1 Cap by mouth every six (6) hours as needed for Pain or Headache. Indications: TENSION-TYPE HEADACHE   Continue Taking    AMLODIPINE (NORVASC) 10 MG TABLET    Take 10 mg by mouth daily. ASPIRIN 81 MG CHEWABLE TABLET    Take 81 mg by mouth daily. CINNAMON BARK (CINNAMON) 500 MG CAP    Take 1,000 mg by mouth. DOCOSAHEXANOIC ACID/EPA (FISH OIL PO)    Take  by mouth. FOLIC ACID-VIT V2-FBI I51 2.5-25-1 MG TABLET    Take 1 Tab by mouth daily. IBUPROFEN (MOTRIN) 800 MG TABLET        INDAPAMIDE (LOZOL) 2.5 MG TABLET    2.5 mg daily. INSULIN LISPRO (HUMALOG) 100 UNIT/ML KWIKPEN    6 Units by SubCUTAneous route three (3) times daily. LEVEMIR FLEXTOUCH 100 UNIT/ML (3 ML) INPN    4 Units daily. METFORMIN SR (FORTAMET) TABLET    1,000 mg two (2) times a day. MULTIVITAMIN PO    Take  by mouth. OLMESARTAN (BENICAR) 40 MG TABLET    Take 40 mg by mouth daily. TAMSULOSIN (FLOMAX) 0.4 MG CAPSULE    0.4 mg nightly.     VITAMIN D2 50,000 UNIT CAPSULE       These Medications have changed    No medications on file   Stop Taking    No medications on file     _______________________________    Attestations:  Adan Augustin acting as a scribe for and in the presence of Guy Wheat MD      June 25, 2018 at 9:56 AM       Provider Attestation:      I personally performed the services described in the documentation, reviewed the documentation, as recorded by the scribe in my presence, and it accurately and completely records my words and actions.  June 25, 2018 at 9:56 AM - Guy Wheat MD    _______________________________

## 2018-06-25 NOTE — DISCHARGE INSTRUCTIONS
Headache: Care Instructions  Your Care Instructions    Headaches have many possible causes. Most headaches aren't a sign of a more serious problem, and they will get better on their own. Home treatment may help you feel better faster. The doctor has checked you carefully, but problems can develop later. If you notice any problems or new symptoms, get medical treatment right away. Follow-up care is a key part of your treatment and safety. Be sure to make and go to all appointments, and call your doctor if you are having problems. It's also a good idea to know your test results and keep a list of the medicines you take. How can you care for yourself at home? · Do not drive if you have taken a prescription pain medicine. · Rest in a quiet, dark room until your headache is gone. Close your eyes and try to relax or go to sleep. Don't watch TV or read. · Put a cold, moist cloth or cold pack on the painful area for 10 to 20 minutes at a time. Put a thin cloth between the cold pack and your skin. · Use a warm, moist towel or a heating pad set on low to relax tight shoulder and neck muscles. · Have someone gently massage your neck and shoulders. · Take pain medicines exactly as directed. ¨ If the doctor gave you a prescription medicine for pain, take it as prescribed. ¨ If you are not taking a prescription pain medicine, ask your doctor if you can take an over-the-counter medicine. · Be careful not to take pain medicine more often than the instructions allow, because you may get worse or more frequent headaches when the medicine wears off. · Do not ignore new symptoms that occur with a headache, such as a fever, weakness or numbness, vision changes, or confusion. These may be signs of a more serious problem. To prevent headaches  · Keep a headache diary so you can figure out what triggers your headaches. Avoiding triggers may help you prevent headaches.  Record when each headache began, how long it lasted, and what the pain was like (throbbing, aching, stabbing, or dull). Write down any other symptoms you had with the headache, such as nausea, flashing lights or dark spots, or sensitivity to bright light or loud noise. Note if the headache occurred near your period. List anything that might have triggered the headache, such as certain foods (chocolate, cheese, wine) or odors, smoke, bright light, stress, or lack of sleep. · Find healthy ways to deal with stress. Headaches are most common during or right after stressful times. Take time to relax before and after you do something that has caused a headache in the past.  · Try to keep your muscles relaxed by keeping good posture. Check your jaw, face, neck, and shoulder muscles for tension, and try relaxing them. When sitting at a desk, change positions often, and stretch for 30 seconds each hour. · Get plenty of sleep and exercise. · Eat regularly and well. Long periods without food can trigger a headache. · Treat yourself to a massage. Some people find that regular massages are very helpful in relieving tension. · Limit caffeine by not drinking too much coffee, tea, or soda. But don't quit caffeine suddenly, because that can also give you headaches. · Reduce eyestrain from computers by blinking frequently and looking away from the computer screen every so often. Make sure you have proper eyewear and that your monitor is set up properly, about an arm's length away. · Seek help if you have depression or anxiety. Your headaches may be linked to these conditions. Treatment can both prevent headaches and help with symptoms of anxiety or depression. When should you call for help? Call 911 anytime you think you may need emergency care. For example, call if:  ? · You have signs of a stroke. These may include:  ¨ Sudden numbness, paralysis, or weakness in your face, arm, or leg, especially on only one side of your body. ¨ Sudden vision changes.   ¨ Sudden trouble speaking. ¨ Sudden confusion or trouble understanding simple statements. ¨ Sudden problems with walking or balance. ¨ A sudden, severe headache that is different from past headaches. ?Call your doctor now or seek immediate medical care if:  ? · You have a new or worse headache. ? · Your headache gets much worse. Where can you learn more? Go to http://carolynn-marii.info/. Enter M271 in the search box to learn more about \"Headache: Care Instructions. \"  Current as of: October 14, 2016  Content Version: 11.4  © 4428-7364 [employer]. Care instructions adapted under license by [employer] (which disclaims liability or warranty for this information). If you have questions about a medical condition or this instruction, always ask your healthcare professional. [address] any warranty or liability for your use of this information. Sore Throat: Care Instructions  Your Care Instructions    Infection by bacteria or a virus causes most sore throats. Cigarette smoke, dry air, air pollution, allergies, and yelling can also cause a sore throat. Sore throats can be painful and annoying. Fortunately, most sore throats go away on their own. If you have a bacterial infection, your doctor may prescribe antibiotics. Follow-up care is a key part of your treatment and safety. Be sure to make and go to all appointments, and call your doctor if you are having problems. It's also a good idea to know your test results and keep a list of the medicines you take. How can you care for yourself at home? · If your doctor prescribed antibiotics, take them as directed. Do not stop taking them just because you feel better. You need to take the full course of antibiotics. · Gargle with warm salt water once an hour to help reduce swelling and relieve discomfort. Use 1 teaspoon of salt mixed in 1 cup of warm water.   · Take an over-the-counter pain medicine, such as acetaminophen (Tylenol), ibuprofen (Advil, Motrin), or naproxen (Aleve). Read and follow all instructions on the label. · Be careful when taking over-the-counter cold or flu medicines and Tylenol at the same time. Many of these medicines have acetaminophen, which is Tylenol. Read the labels to make sure that you are not taking more than the recommended dose. Too much acetaminophen (Tylenol) can be harmful. · Drink plenty of fluids. Fluids may help soothe an irritated throat. Hot fluids, such as tea or soup, may help decrease throat pain. · Use over-the-counter throat lozenges to soothe pain. Regular cough drops or hard candy may also help. These should not be given to young children because of the risk of choking. · Do not smoke or allow others to smoke around you. If you need help quitting, talk to your doctor about stop-smoking programs and medicines. These can increase your chances of quitting for good. · Use a vaporizer or humidifier to add moisture to your bedroom. Follow the directions for cleaning the machine. When should you call for help? Call your doctor now or seek immediate medical care if:  ? · You have new or worse trouble swallowing. ? · Your sore throat gets much worse on one side. ? Watch closely for changes in your health, and be sure to contact your doctor if you do not get better as expected. Where can you learn more? Go to http://carolynn-marii.info/. Enter [id number] in the search box to learn more about \"Sore Throat: Care Instructions. \"  Current as of: May 12, 2017  Content Version: 11.4  © 7465-3778 [employer]. Care instructions adapted under license by [employer] (which disclaims liability or warranty for this information). If you have questions about a medical condition or this instruction, always ask your healthcare professional. [address] any warranty or liability for your use of this information.

## 2018-06-26 LAB
B-HEM STREP THROAT QL CULT: NEGATIVE
BACTERIA SPEC CULT: ABNORMAL
SERVICE CMNT-IMP: ABNORMAL

## 2018-06-26 NOTE — PROGRESS NOTES
Attempted to contact patient to notify of results. Home number listed is incorrect. Pt was treated in the ED and does not require additional tx.

## 2019-05-01 ENCOUNTER — HOSPITAL ENCOUNTER (OUTPATIENT)
Dept: GENERAL RADIOLOGY | Age: 64
Discharge: HOME | End: 2019-05-01
Payer: COMMERCIAL

## 2019-05-01 DIAGNOSIS — M25.511 RIGHT SHOULDER PAIN, UNSPECIFIED CHRONICITY: ICD-10-CM

## 2019-05-01 PROCEDURE — 73030 X-RAY EXAM OF SHOULDER: CPT

## 2019-06-06 ENCOUNTER — HOSPITAL ENCOUNTER (EMERGENCY)
Age: 64
Discharge: HOME | End: 2019-06-06
Attending: EMERGENCY MEDICINE
Payer: COMMERCIAL

## 2019-06-06 VITALS
DIASTOLIC BLOOD PRESSURE: 77 MMHG | OXYGEN SATURATION: 98 % | TEMPERATURE: 99 F | BODY MASS INDEX: 30.16 KG/M2 | HEART RATE: 80 BPM | HEIGHT: 68 IN | RESPIRATION RATE: 18 BRPM | SYSTOLIC BLOOD PRESSURE: 159 MMHG | WEIGHT: 199 LBS

## 2019-06-06 DIAGNOSIS — J01.00 ACUTE NON-RECURRENT MAXILLARY SINUSITIS: Primary | ICD-10-CM

## 2019-06-06 PROCEDURE — 99282 EMERGENCY DEPT VISIT SF MDM: CPT

## 2019-06-06 RX ORDER — AMOXICILLIN 250 MG/1
250 CAPSULE ORAL 3 TIMES DAILY
Qty: 30 CAP | Refills: 0 | Status: SHIPPED | OUTPATIENT
Start: 2019-06-06 | End: 2019-06-16

## 2019-06-06 NOTE — DISCHARGE INSTRUCTIONS
Patient Education        Sinusitis: Care Instructions  Your Care Instructions    Sinusitis is an infection of the lining of the sinus cavities in your head. Sinusitis often follows a cold. It causes pain and pressure in your head and face. In most cases, sinusitis gets better on its own in 1 to 2 weeks. But some mild symptoms may last for several weeks. Sometimes antibiotics are needed. Follow-up care is a key part of your treatment and safety. Be sure to make and go to all appointments, and call your doctor if you are having problems. It's also a good idea to know your test results and keep a list of the medicines you take. How can you care for yourself at home? · Take an over-the-counter pain medicine, such as acetaminophen (Tylenol), ibuprofen (Advil, Motrin), or naproxen (Aleve). Read and follow all instructions on the label. · If the doctor prescribed antibiotics, take them as directed. Do not stop taking them just because you feel better. You need to take the full course of antibiotics. · Be careful when taking over-the-counter cold or flu medicines and Tylenol at the same time. Many of these medicines have acetaminophen, which is Tylenol. Read the labels to make sure that you are not taking more than the recommended dose. Too much acetaminophen (Tylenol) can be harmful. · Breathe warm, moist air from a steamy shower, a hot bath, or a sink filled with hot water. Avoid cold, dry air. Using a humidifier in your home may help. Follow the directions for cleaning the machine. · Use saline (saltwater) nasal washes to help keep your nasal passages open and wash out mucus and bacteria. You can buy saline nose drops at a grocery store or drugstore. Or you can make your own at home by adding 1 teaspoon of salt and 1 teaspoon of baking soda to 2 cups of distilled water. If you make your own, fill a bulb syringe with the solution, insert the tip into your nostril, and squeeze gently. Real Ply your nose.   · Put a hot, wet towel or a warm gel pack on your face 3 or 4 times a day for 5 to 10 minutes each time. · Try a decongestant nasal spray like oxymetazoline (Afrin). Do not use it for more than 3 days in a row. Using it for more than 3 days can make your congestion worse. When should you call for help? Call your doctor now or seek immediate medical care if:    · You have new or worse swelling or redness in your face or around your eyes.     · You have a new or higher fever.    Watch closely for changes in your health, and be sure to contact your doctor if:    · You have new or worse facial pain.     · The mucus from your nose becomes thicker (like pus) or has new blood in it.     · You are not getting better as expected. Where can you learn more? Go to http://carolynn-marii.info/. Enter E253 in the search box to learn more about \"Sinusitis: Care Instructions. \"  Current as of: March 27, 2018  Content Version: 11.9  © 9197-8588 [employer]. Care instructions adapted under license by [employer] (which disclaims liability or warranty for this information). If you have questions about a medical condition or this instruction, always ask your healthcare professional. [address] any warranty or liability for your use of this information.

## 2019-06-06 NOTE — ED PROVIDER NOTES
HPI complains of a 3 to 4-day history of stuffy nose congestion and bilateral sinus pain. He states he blew his nose several times in the last day or so and some streaks of blood in the mucus as well. He denies any fever chills nausea vomiting chest pain or shortness of breath. .  No other symptoms or complaints at this time. Past Medical History:   Diagnosis Date    Allergic rhinitis     Atherosclerotic PVD with intermittent claudication (HCC)     Chest pain, unspecified     Diabetes mellitus type 2, uncontrolled, without complications (HCC)     Essential hypertension, benign     Hyperlipidemia     Phimosis/redundant prepuce     Positive PPD     BCG related    Vitamin D deficiency        Past Surgical History:   Procedure Laterality Date    HX UROLOGICAL  03/31/2017    tonkin, [facility], Circumcision.          Family History:   Problem Relation Age of Onset    Heart Disease Mother        Social History     Socioeconomic History    Marital status:      Spouse name: Not on file    Number of children: Not on file    Years of education: Not on file    Highest education level: Not on file   Occupational History    Not on file   Social Needs    Financial resource strain: Not on file    Food insecurity:     Worry: Not on file     Inability: Not on file    Transportation needs:     Medical: Not on file     Non-medical: Not on file   Tobacco Use    Smoking status: Never Smoker    Smokeless tobacco: Never Used   Substance and Sexual Activity    Alcohol use: Yes     Comment: occasionally    Drug use: No    Sexual activity: Not Currently   Lifestyle    Physical activity:     Days per week: Not on file     Minutes per session: Not on file    Stress: Not on file   Relationships    Social connections:     Talks on phone: Not on file     Gets together: Not on file     Attends Restorationist service: Not on file     Active member of club or organization: Not on file     Attends meetings of clubs or organizations: Not on file     Relationship status: Not on file    Intimate partner violence:     Fear of current or ex partner: Not on file     Emotionally abused: Not on file     Physically abused: Not on file     Forced sexual activity: Not on file   Other Topics Concern    Not on file   Social History Narrative    ** Merged History Encounter **              ALLERGIES: Patient has no known allergies. Review of Systems   Constitutional: Negative. HENT: Positive for postnasal drip, rhinorrhea, sinus pressure and sinus pain. Eyes: Negative. Respiratory: Negative. Cardiovascular: Negative. Gastrointestinal: Negative. Genitourinary: Negative. Musculoskeletal: Negative. Skin: Negative. Neurological: Negative. Vitals:    06/06/19 1129   BP: 159/77   Pulse: 80   Resp: 18   Temp: 99 °F (37.2 °C)   SpO2: 98%   Weight: 90.3 kg (199 lb)   Height: 5' 8\" (1.727 m)            Physical Exam   Constitutional: He is oriented to person, place, and time. He appears well-developed and well-nourished. HENT:   Head: Normocephalic and atraumatic. Mouth/Throat: Oropharynx is clear and moist.   Bilateral maxillary sinus tenderness to percussion. Eyes: Pupils are equal, round, and reactive to light. EOM are normal.   Neck: Neck supple. Cardiovascular: Normal rate and regular rhythm. Pulmonary/Chest: Effort normal and breath sounds normal.   Musculoskeletal: Normal range of motion. Neurological: He is alert and oriented to person, place, and time. Skin: Skin is warm and dry. Psychiatric: He has a normal mood and affect. Nursing note and vitals reviewed. MDM  Patient understands and agrees with the disposition and follow-up plan.   Chen White MD 12:20 PM       Procedures

## 2019-07-23 ENCOUNTER — APPOINTMENT (OUTPATIENT)
Dept: GENERAL RADIOLOGY | Age: 64
End: 2019-07-23
Attending: PHYSICIAN ASSISTANT
Payer: COMMERCIAL

## 2019-07-23 ENCOUNTER — HOSPITAL ENCOUNTER (EMERGENCY)
Age: 64
Discharge: HOME | End: 2019-07-23
Attending: EMERGENCY MEDICINE
Payer: COMMERCIAL

## 2019-07-23 VITALS
DIASTOLIC BLOOD PRESSURE: 91 MMHG | SYSTOLIC BLOOD PRESSURE: 184 MMHG | BODY MASS INDEX: 30.16 KG/M2 | OXYGEN SATURATION: 98 % | WEIGHT: 199 LBS | RESPIRATION RATE: 23 BRPM | TEMPERATURE: 98.7 F | HEART RATE: 76 BPM | HEIGHT: 68 IN

## 2019-07-23 DIAGNOSIS — R07.89 ATYPICAL CHEST PAIN: Primary | ICD-10-CM

## 2019-07-23 DIAGNOSIS — I10 ELEVATED BLOOD PRESSURE READING WITH DIAGNOSIS OF HYPERTENSION: ICD-10-CM

## 2019-07-23 LAB
ALBUMIN SERPL-MCNC: 3.9 G/DL (ref 3.4–5)
ALBUMIN/GLOB SERPL: 0.8 {RATIO} (ref 0.8–1.7)
ALP SERPL-CCNC: 85 U/L (ref 45–117)
ALT SERPL-CCNC: 23 U/L (ref 16–61)
ANION GAP SERPL CALC-SCNC: 6 MMOL/L (ref 3–18)
AST SERPL-CCNC: 21 U/L (ref 10–38)
ATRIAL RATE: 86 BPM
BASOPHILS # BLD: 0.1 K/UL (ref 0–0.1)
BASOPHILS NFR BLD: 1 % (ref 0–2)
BILIRUB SERPL-MCNC: 0.7 MG/DL (ref 0.2–1)
BUN SERPL-MCNC: 13 MG/DL (ref 7–18)
BUN/CREAT SERPL: 10 (ref 12–20)
CALCIUM SERPL-MCNC: 9.3 MG/DL (ref 8.5–10.1)
CALCULATED P AXIS, ECG09: 99 DEGREES
CALCULATED R AXIS, ECG10: -48 DEGREES
CALCULATED T AXIS, ECG11: 73 DEGREES
CHLORIDE SERPL-SCNC: 99 MMOL/L (ref 100–111)
CO2 SERPL-SCNC: 31 MMOL/L (ref 21–32)
CREAT SERPL-MCNC: 1.31 MG/DL (ref 0.6–1.3)
D DIMER PPP FEU-MCNC: 0.44 UG/ML(FEU)
DIAGNOSIS, 93000: NORMAL
DIFFERENTIAL METHOD BLD: ABNORMAL
EOSINOPHIL # BLD: 0.2 K/UL (ref 0–0.4)
EOSINOPHIL NFR BLD: 3 % (ref 0–5)
ERYTHROCYTE [DISTWIDTH] IN BLOOD BY AUTOMATED COUNT: 12.9 % (ref 11.6–14.5)
GLOBULIN SER CALC-MCNC: 4.6 G/DL (ref 2–4)
GLUCOSE SERPL-MCNC: 114 MG/DL (ref 74–99)
HCT VFR BLD AUTO: 42.2 % (ref 36–48)
HGB BLD-MCNC: 14.6 G/DL (ref 13–16)
LYMPHOCYTES # BLD: 2.2 K/UL (ref 0.9–3.6)
LYMPHOCYTES NFR BLD: 31 % (ref 21–52)
MAGNESIUM SERPL-MCNC: 2.1 MG/DL (ref 1.6–2.6)
MCH RBC QN AUTO: 32.8 PG (ref 24–34)
MCHC RBC AUTO-ENTMCNC: 34.6 G/DL (ref 31–37)
MCV RBC AUTO: 94.8 FL (ref 74–97)
MONOCYTES # BLD: 0.7 K/UL (ref 0.05–1.2)
MONOCYTES NFR BLD: 10 % (ref 3–10)
NEUTS SEG # BLD: 4 K/UL (ref 1.8–8)
NEUTS SEG NFR BLD: 55 % (ref 40–73)
P-R INTERVAL, ECG05: 176 MS
PLATELET # BLD AUTO: 230 K/UL (ref 135–420)
PMV BLD AUTO: 9.6 FL (ref 9.2–11.8)
POTASSIUM SERPL-SCNC: 4 MMOL/L (ref 3.5–5.5)
PROT SERPL-MCNC: 8.5 G/DL (ref 6.4–8.2)
Q-T INTERVAL, ECG07: 372 MS
QRS DURATION, ECG06: 96 MS
QTC CALCULATION (BEZET), ECG08: 445 MS
RBC # BLD AUTO: 4.45 M/UL (ref 4.7–5.5)
SODIUM SERPL-SCNC: 136 MMOL/L (ref 136–145)
TROPONIN I SERPL-MCNC: <0.02 NG/ML (ref 0–0.04)
VENTRICULAR RATE, ECG03: 86 BPM
WBC # BLD AUTO: 7.2 K/UL (ref 4.6–13.2)

## 2019-07-23 PROCEDURE — 96374 THER/PROPH/DIAG INJ IV PUSH: CPT

## 2019-07-23 PROCEDURE — 74011250637 HC RX REV CODE- 250/637: Performed by: EMERGENCY MEDICINE

## 2019-07-23 PROCEDURE — 93005 ELECTROCARDIOGRAM TRACING: CPT

## 2019-07-23 PROCEDURE — 85025 COMPLETE CBC W/AUTO DIFF WBC: CPT

## 2019-07-23 PROCEDURE — 99285 EMERGENCY DEPT VISIT HI MDM: CPT

## 2019-07-23 PROCEDURE — 80053 COMPREHEN METABOLIC PANEL: CPT

## 2019-07-23 PROCEDURE — 83735 ASSAY OF MAGNESIUM: CPT

## 2019-07-23 PROCEDURE — 84484 ASSAY OF TROPONIN QUANT: CPT

## 2019-07-23 PROCEDURE — 85379 FIBRIN DEGRADATION QUANT: CPT

## 2019-07-23 PROCEDURE — 94762 N-INVAS EAR/PLS OXIMTRY CONT: CPT

## 2019-07-23 PROCEDURE — 71046 X-RAY EXAM CHEST 2 VIEWS: CPT

## 2019-07-23 PROCEDURE — 74011000250 HC RX REV CODE- 250: Performed by: EMERGENCY MEDICINE

## 2019-07-23 RX ORDER — METOPROLOL TARTRATE 5 MG/5ML
2.5 INJECTION INTRAVENOUS
Status: COMPLETED | OUTPATIENT
Start: 2019-07-23 | End: 2019-07-23

## 2019-07-23 RX ORDER — ACETAMINOPHEN 325 MG/1
650 TABLET ORAL ONCE
Status: COMPLETED | OUTPATIENT
Start: 2019-07-23 | End: 2019-07-23

## 2019-07-23 RX ADMIN — METOPROLOL TARTRATE 2.5 MG: 5 INJECTION INTRAVENOUS at 17:30

## 2019-07-23 RX ADMIN — ACETAMINOPHEN 650 MG: 325 TABLET ORAL at 17:27

## 2019-07-23 NOTE — DISCHARGE INSTRUCTIONS
Patient Education        Chest Pain: Care Instructions  Your Care Instructions    There are many things that can cause chest pain. Some are not serious and will get better on their own in a few days. But some kinds of chest pain need more testing and treatment. Your doctor may have recommended a follow-up visit in the next 8 to 12 hours. If you are not getting better, you may need more tests or treatment. Even though your doctor has released you, you still need to watch for any problems. The doctor carefully checked you, but sometimes problems can develop later. If you have new symptoms or if your symptoms do not get better, get medical care right away. If you have worse or different chest pain or pressure that lasts more than 5 minutes or you passed out (lost consciousness), call 911 or seek other emergency help right away. A medical visit is only one step in your treatment. Even if you feel better, you still need to do what your doctor recommends, such as going to all suggested follow-up appointments and taking medicines exactly as directed. This will help you recover and help prevent future problems. How can you care for yourself at home? · Rest until you feel better. · Take your medicine exactly as prescribed. Call your doctor if you think you are having a problem with your medicine. · Do not drive after taking a prescription pain medicine. When should you call for help? Call 911 if:    · You passed out (lost consciousness).     · You have severe difficulty breathing.     · You have symptoms of a heart attack. These may include:  ? Chest pain or pressure, or a strange feeling in your chest.  ? Sweating. ? Shortness of breath. ? Nausea or vomiting. ? Pain, pressure, or a strange feeling in your back, neck, jaw, or upper belly or in one or both shoulders or arms. ? Lightheadedness or sudden weakness. ? A fast or irregular heartbeat.   After you call 911, the  may tell you to chew 1 adult-strength or 2 to 4 low-dose aspirin. Wait for an ambulance. Do not try to drive yourself.    Call your doctor today if:    · You have any trouble breathing.     · Your chest pain gets worse.     · You are dizzy or lightheaded, or you feel like you may faint.     · You are not getting better as expected.     · You are having new or different chest pain. Where can you learn more? Go to http://carolynn-marii.info/. Enter A120 in the search box to learn more about \"Chest Pain: Care Instructions. \"  Current as of: September 23, 2018  Content Version: 12.1  © 6046-6973 [employer]. Care instructions adapted under license by [employer] (which disclaims liability or warranty for this information). If you have questions about a medical condition or this instruction, always ask your healthcare professional. [place] [address] any warranty or liability for your use of this information. Patient Education        Low Sodium Diet (2,000 Milligram): Care Instructions  Your Care Instructions    Too much sodium causes your body to hold on to extra water. This can raise your blood pressure and force your heart and kidneys to work harder. In very serious cases, this could cause you to be put in the hospital. It might even be life-threatening. By limiting sodium, you will feel better and lower your risk of serious problems. The most common source of sodium is salt. People get most of the salt in their diet from canned, prepared, and packaged foods. Fast food and restaurant meals also are very high in sodium. Your doctor will probably limit your sodium to less than 2,000 milligrams (mg) a day. This limit counts all the sodium in prepared and packaged foods and any salt you add to your food. Follow-up care is a key part of your treatment and safety. Be sure to make and go to all appointments, and call your doctor if you are having problems.  It's also a good idea to know your test results and keep a list of the medicines you take. How can you care for yourself at home? Read food labels  · Read labels on cans and food packages. The labels tell you how much sodium is in each serving. Make sure that you look at the serving size. If you eat more than the serving size, you have eaten more sodium. · Food labels also tell you the Percent Daily Value for sodium. Choose products with low Percent Daily Values for sodium. · Be aware that sodium can come in forms other than salt, including monosodium glutamate (MSG), sodium citrate, and sodium bicarbonate (baking soda). MSG is often added to Asian food. When you eat out, you can sometimes ask for food without MSG or added salt. Buy low-sodium foods  · Buy foods that are labeled \"unsalted\" (no salt added), \"sodium-free\" (less than 5 mg of sodium per serving), or \"low-sodium\" (less than 140 mg of sodium per serving). Foods labeled \"reduced-sodium\" and \"light sodium\" may still have too much sodium. Be sure to read the label to see how much sodium you are getting. · Buy fresh vegetables, or frozen vegetables without added sauces. Buy low-sodium versions of canned vegetables, soups, and other canned goods. Prepare low-sodium meals  · Cut back on the amount of salt you use in cooking. This will help you adjust to the taste. Do not add salt after cooking. One teaspoon of salt has about 2,300 mg of sodium. · Take the salt shaker off the table. · Flavor your food with garlic, lemon juice, onion, vinegar, herbs, and spices. Do not use soy sauce, lite soy sauce, steak sauce, onion salt, garlic salt, celery salt, mustard, or ketchup on your food. · Use low-sodium salad dressings, sauces, and ketchup. Or make your own salad dressings and sauces without adding salt. · Use less salt (or none) when recipes call for it. You can often use half the salt a recipe calls for without losing flavor.  Other foods such as rice, pasta, and grains do not need added salt. · Rinse canned vegetables, and cook them in fresh water. This removes some--but not all--of the salt. · Avoid water that is naturally high in sodium or that has been treated with water softeners, which add sodium. Call your local water company to find out the sodium content of your water supply. If you buy bottled water, read the label and choose a sodium-free brand. Avoid high-sodium foods  · Avoid eating:  ? Smoked, cured, salted, and canned meat, fish, and poultry. ? Ham, glaser, hot dogs, and luncheon meats. ? Regular, hard, and processed cheese and regular peanut butter. ? Crackers with salted tops, and other salted snack foods such as pretzels, chips, and salted popcorn. ? Frozen prepared meals, unless labeled low-sodium. ? Canned and dried soups, broths, and bouillon, unless labeled sodium-free or low-sodium. ? Canned vegetables, unless labeled sodium-free or low-sodium. ? [place] fries, pizza, tacos, and other fast foods. ? Pickles, olives, ketchup, and other condiments, especially soy sauce, unless labeled sodium-free or low-sodium. Where can you learn more? Go to http://carolynn-marii.info/. Enter O373 in the search box to learn more about \"Low Sodium Diet (2,000 Milligram): Care Instructions. \"  Current as of: November 7, 2018  Content Version: 12.1  © 6774-3789 [employer]. Care instructions adapted under license by [employer] (which disclaims liability or warranty for this information). If you have questions about a medical condition or this instruction, always ask your healthcare professional. [place] [address] any warranty or liability for your use of this information. Patient Education        High Blood Pressure: Care Instructions  Overview    It's normal for blood pressure to go up and down throughout the day. But if it stays up, you have high blood pressure.  Another name for high blood pressure is hypertension. Despite what a lot of people think, high blood pressure usually doesn't cause headaches or make you feel dizzy or lightheaded. It usually has no symptoms. But it does increase your risk of stroke, heart attack, and other problems. You and your doctor will talk about your risks of these problems based on your blood pressure. Your doctor will give you a goal for your blood pressure. Your goal will be based on your health and your age. Lifestyle changes, such as eating healthy and being active, are always important to help lower blood pressure. You might also take medicine to reach your blood pressure goal.  Follow-up care is a key part of your treatment and safety. Be sure to make and go to all appointments, and call your doctor if you are having problems. It's also a good idea to know your test results and keep a list of the medicines you take. How can you care for yourself at home? Medical treatment  · If you stop taking your medicine, your blood pressure will go back up. You may take one or more types of medicine to lower your blood pressure. Be safe with medicines. Take your medicine exactly as prescribed. Call your doctor if you think you are having a problem with your medicine. · Talk to your doctor before you start taking aspirin every day. Aspirin can help certain people lower their risk of a heart attack or stroke. But taking aspirin isn't right for everyone, because it can cause serious bleeding. · See your doctor regularly. You may need to see the doctor more often at first or until your blood pressure comes down. · If you are taking blood pressure medicine, talk to your doctor before you take decongestants or anti-inflammatory medicine, such as ibuprofen. Some of these medicines can raise blood pressure. · Learn how to check your blood pressure at home. Lifestyle changes  · Stay at a healthy weight.  This is especially important if you put on weight around the waist. Losing even 10 pounds can help you lower your blood pressure. · If your doctor recommends it, get more exercise. Walking is a good choice. Bit by bit, increase the amount you walk every day. Try for at least 30 minutes on most days of the week. You also may want to swim, bike, or do other activities. · Avoid or limit alcohol. Talk to your doctor about whether you can drink any alcohol. · Try to limit how much sodium you eat to less than 2,300 milligrams (mg) a day. Your doctor may ask you to try to eat less than 1,500 mg a day. · Eat plenty of fruits (such as bananas and oranges), vegetables, legumes, whole grains, and low-fat dairy products. · Lower the amount of saturated fat in your diet. Saturated fat is found in animal products such as milk, cheese, and meat. Limiting these foods may help you lose weight and also lower your risk for heart disease. · Do not smoke. Smoking increases your risk for heart attack and stroke. If you need help quitting, talk to your doctor about stop-smoking programs and medicines. These can increase your chances of quitting for good. When should you call for help? Call 911 anytime you think you may need emergency care. This may mean having symptoms that suggest that your blood pressure is causing a serious heart or blood vessel problem. Your blood pressure may be over 180/120.   For example, call 911 if:    · You have symptoms of a heart attack. These may include:  ? Chest pain or pressure, or a strange feeling in the chest.  ? Sweating. ? Shortness of breath. ? Nausea or vomiting. ? Pain, pressure, or a strange feeling in the back, neck, jaw, or upper belly or in one or both shoulders or arms. ? Lightheadedness or sudden weakness. ? A fast or irregular heartbeat.     · You have symptoms of a stroke. These may include:  ? Sudden numbness, tingling, weakness, or loss of movement in your face, arm, or leg, especially on only one side of your body. ? Sudden vision changes.   ? Sudden trouble speaking. ? Sudden confusion or trouble understanding simple statements. ? Sudden problems with walking or balance. ? A sudden, severe headache that is different from past headaches.     · You have severe back or belly pain.    Do not wait until your blood pressure comes down on its own. Get help right away.   Call your doctor now or seek immediate care if:    · Your blood pressure is much higher than normal (such as 180/120 or higher), but you don't have symptoms.     · You think high blood pressure is causing symptoms, such as:  ? Severe headache.  ? Blurry vision.    Watch closely for changes in your health, and be sure to contact your doctor if:    · Your blood pressure measures higher than your doctor recommends at least 2 times. That means the top number is higher or the bottom number is higher, or both.     · You think you may be having side effects from your blood pressure medicine. Where can you learn more? Go to http://carolynn-marii.info/. Enter I280 in the search box to learn more about \"High Blood Pressure: Care Instructions. \"  Current as of: July 22, 2018  Content Version: 12.1  © 2966-7793 [employer]. Care instructions adapted under license by [employer] (which disclaims liability or warranty for this information). If you have questions about a medical condition or this instruction, always ask your healthcare professional. [address] any warranty or liability for your use of this information.

## 2019-07-23 NOTE — ED NOTES
I performed a brief history of the patient here in triage and I have determined that pt will need further treatment and evaluation from the main side ER physician. I have placed initial orders based on the history to help in expediting patients care.         Visit Vitals  BP (!) 176/96 (BP 1 Location: Left arm, BP Patient Position: Sitting)   Pulse 84   Temp 98.7 °F (37.1 °C)   Resp 16   Ht 5' 8\" (1.727 m)   Wt 90.3 kg (199 lb)   SpO2 98%   BMI 30.26 kg/m²     [place]. [address]lo [address], PA  07/23/19

## 2019-07-23 NOTE — ED NOTES
C/o left side CP under left breast for past week  EKG completed and given to [facility] to show to physician  Pt denies SOB.   Placed back out in waiting room

## 2019-07-23 NOTE — ED PROVIDER NOTES
EMERGENCY DEPARTMENT HISTORY AND PHYSICAL EXAM    4:13 PM      Date: 7/23/2019  Patient Name: Bentley Cohen    History of Presenting Illness     Chief Complaint   Patient presents with    Chest Pain         History Provided By: Patient    Additional History (Context): Bentley Cohen is a 71 y.o. male with hypertension, hyperlipidemia and Chest pain, diabetes who presents with chief complaint of constant nagging achy chest pain for the past week. Patient states pain is about a 1 out of 10 and on the left side of his chest.  Does admit to working out in the ER before the chest pain started. He denies cough, shortness of breath, radiating pain, leg pain, abdominal pain, back pain, nausea, sweating and no other complaint. Patient states that he has seen Dr. Nicole Corbett in the past and was called the office a while back for follow-up but he did not make appointment. PCP: Sravanthi Manzano MD        Past History     Past Medical History:  Past Medical History:   Diagnosis Date    Allergic rhinitis     Atherosclerotic PVD with intermittent claudication ([address]     Chest pain, unspecified     Diabetes mellitus type 2, uncontrolled, without complications ([address]     Essential hypertension, benign     Hyperlipidemia     Phimosis/redundant prepuce     Positive PPD     BCG related    Vitamin D deficiency        Past Surgical History:  Past Surgical History:   Procedure Laterality Date    HX UROLOGICAL  03/31/2017    tonkin, snlyudmila, Circumcision. Family History:  Family History   Problem Relation Age of Onset    Heart Disease Mother        Social History:  Social History     Tobacco Use    Smoking status: Never Smoker    Smokeless tobacco: Never Used   Substance Use Topics    Alcohol use: Yes     Comment: occasionally    Drug use: No       Allergies:  No Known Allergies      Review of Systems       Review of Systems   Constitutional: Negative for chills and fever.    HENT: Negative for congestion, rhinorrhea, sore throat and trouble swallowing. Eyes: Negative for visual disturbance. Respiratory: Negative for cough, shortness of breath and wheezing. Cardiovascular: Positive for chest pain. Negative for palpitations and leg swelling. Gastrointestinal: Negative for abdominal pain, nausea and vomiting. Endocrine: Negative for polyuria. Genitourinary: Negative for difficulty urinating and dysuria. Musculoskeletal: Negative for arthralgias and neck stiffness. Skin: Negative for rash. Neurological: Negative for dizziness, weakness, numbness and headaches. Hematological: Does not bruise/bleed easily. Psychiatric/Behavioral: Negative for confusion and dysphoric mood. All other systems reviewed and are negative. Physical Exam     Visit Vitals  /86   Pulse 80   Temp 98.7 °F (37.1 °C)   Resp 28   Ht 5' 8\" (1.727 m)   Wt 90.3 kg (199 lb)   SpO2 99%   BMI 30.26 kg/m²         Physical Exam   Constitutional: He is oriented to person, place, and time. He appears well-developed and well-nourished. No distress. HENT:   Head: Normocephalic and atraumatic. Mouth/Throat: Oropharynx is clear and moist.   Eyes: Pupils are equal, round, and reactive to light. Conjunctivae are normal. No scleral icterus. Neck: Normal range of motion. Neck supple. Cardiovascular: Normal rate and intact distal pulses. Exam reveals no gallop and no friction rub. No murmur heard. Capillary refill < 3 seconds   Pulmonary/Chest: Effort normal and breath sounds normal. No respiratory distress. He has no wheezes. He has no rales. He exhibits no tenderness. Abdominal: Soft. Bowel sounds are normal. He exhibits no distension. There is no tenderness. Musculoskeletal: Normal range of motion. He exhibits no edema or tenderness. No calf tenderness   Lymphadenopathy:     He has no cervical adenopathy. Neurological: He is alert and oriented to person, place, and time. No cranial nerve deficit.  He exhibits normal muscle tone. Coordination normal.   Skin: Skin is warm and dry. He is not diaphoretic. Psychiatric: He has a normal mood and affect. His behavior is normal.   Nursing note and vitals reviewed. Diagnostic Study Results     Labs -  Recent Results (from the past 12 hour(s))   EKG, 12 LEAD, INITIAL    Collection Time: 07/23/19  3:11 PM   Result Value Ref Range    Ventricular Rate 86 BPM    Atrial Rate 86 BPM    P-R Interval 176 ms    QRS Duration 96 ms    Q-T Interval 372 ms    QTC Calculation (Bezet) 445 ms    Calculated P Axis 99 degrees    Calculated R Axis -48 degrees    Calculated T Axis 73 degrees    Diagnosis       Normal sinus rhythm  Left anterior fascicular block  Voltage criteria for left ventricular hypertrophy  Abnormal ECG    Confirmed by Kristofer Ramos MD, [facility] ([id number]) on 7/23/2019 6:00:76 PM     METABOLIC PANEL, COMPREHENSIVE    Collection Time: 07/23/19  3:55 PM   Result Value Ref Range    Sodium 136 136 - 145 mmol/L    Potassium 4.0 3.5 - 5.5 mmol/L    Chloride 99 (L) 100 - 111 mmol/L    CO2 31 21 - 32 mmol/L    Anion gap 6 3.0 - 18 mmol/L    Glucose 114 (H) 74 - 99 mg/dL    BUN 13 7.0 - 18 MG/DL    Creatinine 1.31 (H) 0.6 - 1.3 MG/DL    BUN/Creatinine ratio 10 (L) 12 - 20      GFR est AA >60 >60 ml/min/1.73m2    GFR est non-AA 55 (L) >60 ml/min/1.73m2    Calcium 9.3 8.5 - 10.1 MG/DL    Bilirubin, total 0.7 0.2 - 1.0 MG/DL    ALT (SGPT) 23 16 - 61 U/L    AST (SGOT) 21 10 - 38 U/L    Alk.  phosphatase 85 45 - 117 U/L    Protein, total 8.5 (H) 6.4 - 8.2 g/dL    Albumin 3.9 3.4 - 5.0 g/dL    Globulin 4.6 (H) 2.0 - 4.0 g/dL    A-G Ratio 0.8 0.8 - 1.7     CBC WITH AUTOMATED DIFF    Collection Time: 07/23/19  3:55 PM   Result Value Ref Range    WBC 7.2 4.6 - 13.2 K/uL    RBC 4.45 (L) 4.70 - 5.50 M/uL    HGB 14.6 13.0 - 16.0 g/dL    HCT 42.2 36.0 - 48.0 %    MCV 94.8 74.0 - 97.0 FL    MCH 32.8 24.0 - 34.0 PG    MCHC 34.6 31.0 - 37.0 g/dL    RDW 12.9 11.6 - 14.5 %    PLATELET 586 340 - 517 K/uL    MPV 9.6 9.2 - 11.8 FL    NEUTROPHILS 55 40 - 73 %    LYMPHOCYTES 31 21 - 52 %    MONOCYTES 10 3 - 10 %    EOSINOPHILS 3 0 - 5 %    BASOPHILS 1 0 - 2 %    ABS. NEUTROPHILS 4.0 1.8 - 8.0 K/UL    ABS. LYMPHOCYTES 2.2 0.9 - 3.6 K/UL    ABS. MONOCYTES 0.7 0.05 - 1.2 K/UL    ABS. EOSINOPHILS 0.2 0.0 - 0.4 K/UL    ABS. BASOPHILS 0.1 0.0 - 0.1 K/UL    DF AUTOMATED     TROPONIN I    Collection Time: 07/23/19  3:55 PM   Result Value Ref Range    Troponin-I, QT <0.02 0.0 - 0.045 NG/ML   MAGNESIUM    Collection Time: 07/23/19  3:55 PM   Result Value Ref Range    Magnesium 2.1 1.6 - 2.6 mg/dL   D DIMER    Collection Time: 07/23/19  3:55 PM   Result Value Ref Range    D DIMER 0.44 <0.46 ug/ml(FEU)       Radiologic Studies -   XR CHEST PA LAT   Final Result   IMPRESSION:      1. No radiographic evidence for an acute cardiopulmonary abnormality. Medical Decision Making   I am the first provider for this patient. I reviewed the vital signs, available nursing notes, past medical history, past surgical history, family history and social history. Vital Signs-Reviewed the patient's vital signs. Pulse Oximetry Analysis -  98 on room air (Interpretation) normal    Cardiac Monitor:  Rate: 76  Rhythm:  Normal Sinus Rhythm     EKG: Interpreted by the EP Dr. Alondra Morales.    Time Interpreted: 3:13 PM   Rate: 86   Rhythm: Normal Sinus Rhythm    Interpretation: Normal QRS duration, normal QTC, left axis deviation, possible LVH criteria, no ST elevation, no ST depressions   Comparison: Very similar to old EKG on 9/21/2017, no significant change on today's EKG    Records Reviewed: Nursing Notes and Old Medical Records (Time of Review: 4:13 PM)    Provider Notes (Medical Decision Making): DDX: Cardiac, musculoskeletal, GERD, esophageal spasm, anxiety, PE, elevated blood pressure with history of hypertension    Labs, EKG, chest x-ray, pain medication  MDM    Medications   metoprolol (LOPRESSOR) injection 2.5 mg (2.5 mg IntraVENous Given 19 1730)   acetaminophen (TYLENOL) tablet 650 mg (650 mg Oral Given 19 1727)       Procedures:   SUMMARY:2017-echo  Left ventricle: Systolic function was normal. Ejection fraction was  estimated in the range of 55 % to 60 %. There were no regional wall motion  abnormalities. There was mild concentric hypertrophy. Doppler parameters  were consistent with abnormal left ventricular relaxation (grade 1  diastolic dysfunction). Mitral valve: There was mild regurgitation. NUCLEAR IMAGIN2017     Findings:   1. Stress images reveal normal Myoview distrubution in all the LV segments in short axis, vertical and horizontal long axis views. 2. Resting images have a normal uptake. 3. Gated images reveal normal wall motion and the ejection fraction is calculated to be 58%. Conclusion:   1. Normal perfusion scan. 2. Normal wall motion and ejection fraction. 3. No evidence of significant fixed or reversible defect suggesting ischemia or myocardial infarction noted from this nuclear study. 4. Low risk scan. ED Course: Progress Notes, Reevaluation, and Consults:  Labs reassuring including negative d-dimer    Patient states that his blood pressure is usually not this high. He does report that he stopped taking amlodipine because it made him dizzy when he took that along with the [address] [place] [address]. States that he has not taken amlodipine recently. Patient states he has his amlodipine at home, will have him restart this. Gave dose of Lopressor IV    Possibly muscular in nature associated with him working out in the ER doing a lot of work. However with his risk factors we will have him call Dr. Paul Morley for outpatient follow-up. Patient did have a negative nuclear stress test in 2017. I have reassessed the patient. I have discussed the workup, results and plan with the patient and patient is in agreement. Patient is feeling better. Patient was discharge in stable condition.  Patient was given outpatient follow up. Patient is to return to emergency department if any new or worsening condition. Diagnosis     Clinical Impression:   1. Atypical chest pain    2. Elevated blood pressure reading with diagnosis of hypertension        Disposition: Discharged    Follow-up Information     Follow up With Specialties Details Why Bo Guajardo MD Cardiology, Internal Medicine Schedule an appointment as soon as possible for a visit in 1 day Get repeat blood pressure check [address] [facility] Colon, MD Internal Medicine Schedule an appointment as soon as possible for a visit in 3 days Get repeat blood pressure check [address] EMERGENCY DEPT Emergency Medicine  As needed, If symptoms worsen [facility]  [phone]           Patient's Medications   Start Taking    No medications on file   Continue Taking    ASPIRIN 81 MG CHEWABLE TABLET    Take 81 mg by mouth daily. CINNAMON BARK (CINNAMON) 500 MG CAP    Take 1,000 mg by mouth. DOCOSAHEXANOIC ACID/EPA (FISH OIL PO)    Take  by mouth. FOLIC ACID-VIT W4-EMMANUEL P82 2.5-25-1 MG TABLET    Take 1 Tab by mouth daily. IBUPROFEN (MOTRIN) 800 MG TABLET        INSULIN LISPRO (HUMALOG) 100 UNIT/ML KWIKPEN    6 Units by SubCUTAneous route three (3) times daily. LEVEMIR FLEXTOUCH 100 UNIT/ML (3 ML) INPN    4 Units daily. METFORMIN SR (FORTAMET) TABLET    1,000 mg daily. MULTIVITAMIN PO    Take  by mouth. OLMESARTAN (BENICAR) 40 MG TABLET    Take 40 mg by mouth daily. TAMSULOSIN (FLOMAX) 0.4 MG CAPSULE    0.4 mg nightly. VITAMIN D2 50,000 UNIT CAPSULE    50,000 Units every seven (7) days. These Medications have changed    No medications on file   Stop Taking    INDAPAMIDE (LOZOL) 2.5 MG TABLET    2.5 mg daily.          Edwina Pagan DO    Dragon medical dictation software was used for portions of this report. Unintended transcription errors may occur. My signature above authenticates this document and my orders, the final    diagnosis (es), discharge prescription (s), and instructions in the Epic    record.

## 2019-12-12 ENCOUNTER — HOSPITAL ENCOUNTER (OUTPATIENT)
Dept: ULTRASOUND IMAGING | Age: 64
Discharge: HOME | End: 2019-12-12
Attending: INTERNAL MEDICINE
Payer: COMMERCIAL

## 2019-12-12 ENCOUNTER — HOSPITAL ENCOUNTER (OUTPATIENT)
Dept: MAMMOGRAPHY | Age: 64
Discharge: HOME | End: 2019-12-12
Attending: INTERNAL MEDICINE
Payer: COMMERCIAL

## 2019-12-12 DIAGNOSIS — N64.59 ABNORMAL BREAST EXAM: ICD-10-CM

## 2019-12-12 PROCEDURE — 77066 DX MAMMO INCL CAD BI: CPT

## 2020-02-03 ENCOUNTER — HOSPITAL ENCOUNTER (OUTPATIENT)
Dept: VASCULAR SURGERY | Age: 65
Discharge: HOME | End: 2020-02-03
Attending: INTERNAL MEDICINE
Payer: COMMERCIAL

## 2020-02-03 DIAGNOSIS — I10 ESSENTIAL HYPERTENSION, BENIGN: ICD-10-CM

## 2020-02-03 PROCEDURE — 93975 VASCULAR STUDY: CPT

## 2020-02-04 LAB
LEFT HILAR EDV: 22.5 CM/S
LEFT HILAR PSV: 93.5 CM/S
LEFT KIDNEY LENGTH: 10.3 CM
LEFT KIDNEY WIDTH: 5.3 CM
LEFT RENAL DIST DIAS: 25 CM/S
LEFT RENAL DIST RI: 0.72
LEFT RENAL DIST SYS: 88.6 CM/S
LEFT RENAL MID DIAS: 25.8 CM/S
LEFT RENAL MID RI: 0.73
LEFT RENAL MID SYS: 96.1 CM/S
LEFT RENAL ORIGIN DIAS: 19.2 CM/S
LEFT RENAL ORIGIN RI: 0.73
LEFT RENAL ORIGIN SYS: 71.5 CM/S
LEFT RENAL PROX DIAS: 19.2 CM/S
LEFT RENAL PROX RI: 0.78
LEFT RENAL PROX SYS: 86.1 CM/S
RIGHT HILAR EDV: 35 CM/S
RIGHT HILAR PSV: 123.8 CM/S
RIGHT KIDNEY LENGTH: 10.5 CM
RIGHT KIDNEY WIDTH: 5.4 CM
RIGHT RENAL DIST DIAS: 29.1 CM/S
RIGHT RENAL DIST RI: 0.73
RIGHT RENAL DIST SYS: 109.2 CM/S
RIGHT RENAL MID DIAS: 26.8 CM/S
RIGHT RENAL MID RI: 0.74
RIGHT RENAL MID SYS: 102.2 CM/S
RIGHT RENAL PROX DIAS: 26.7 CM/S
RIGHT RENAL PROX RI: 0.76
RIGHT RENAL PROX SYS: 112.8 CM/S

## 2020-03-09 ENCOUNTER — TELEPHONE (OUTPATIENT)
Dept: PHARMACY | Age: 65
End: 2020-03-09

## 2020-03-09 NOTE — TELEPHONE ENCOUNTER
Unable to reach patient by phone  Attempting to reach patient to discuss signing up for the DM Program. Left message asking for return call to toll free [phone] option 7.     [facility], toll free: [phone], option 7

## 2020-04-03 ENCOUNTER — PATIENT OUTREACH (OUTPATIENT)
Dept: OTHER | Age: 65
End: 2020-04-03

## 2020-04-03 NOTE — PROGRESS NOTES
Patient on [facility] Benefits. Received referral from [address] [place] [address] for this patient. I was unable to leave a message for patient's wife on phone number listed. Will attempt to contact again on 4/6. Need to complete post-discharge assessment. Received referral from [place] at [address] [place] [address] on 4/3. Admitted to [facility] on 3/9/2020 with Subdural hemorrhage, SAH, left tibia fracture.   Clinical summary:  3/13/2020- external fixator, closed reduction to right knee   3/28/2020- ORIF lle removal of external fixator  3/17/2020-trach/peg placed   3/22/2020- craniotomy for evacuation of worsening subdural hematoma

## 2020-04-06 ENCOUNTER — PATIENT OUTREACH (OUTPATIENT)
Dept: OTHER | Age: 65
End: 2020-04-06

## 2020-04-06 NOTE — PROGRESS NOTES
Patient identified as eligible for [address] [place] [address] services. Second telephone outreach attempted. Left discreet voicemail with this CM confidential contact information. Will send UTR letter. Will try back on 4/9.

## 2020-04-06 NOTE — LETTER
4/6/2020 10:47 AM 
 
Mr. Sergio Vega [address] [facility] [address] Dear Mr. Eliana Sargent, My name is Que Feng , Associate Care Manager for [address] [place] [address], and I have been trying to reach you. The Associate Care  is a free-of-charge, confidential service provided to our associates and their family members covered by the [facility]. Part of my job is to follow up with members who have recently been in the hospital or emergency room, to help them coordinate their care and answer questions they may have about their visit. I am able to provide assistance with medication questions, scheduling needed follow-up appointments, and arranging services like home health or home medical equipment. I can also provide education regarding your hospital or ER visit as well as your medical conditions. As healthcare providers, we know that patients do better when they have close follow up with a primary care provider (PCP), especially after a hospital or emergency department visit. If you do not have a PCP, I can help you find one that is convenient to you and covered by your insurance. I can also help you understand any after visit instructions, such as what symptoms to watch out for, or any new or changed medications. Remember that you can access your After Visit Summary by logging into your G5 account. If you do not have a G5 account, I can help you request access. Our program is designed to provide you with the opportunity to have a [address] [place] [address] FOR CHILDREN partner with you for your healthcare needs. Please contact me at the below number if I can provide you with assistance for any of the above services. Sincerely, GER SalinasN  Associate Care Manager [address] [place] [address] [address] [facility]  [address] [address] [phone]  F [phone]  [email or web address] 
 [facility] email: [email or web address]

## 2020-04-09 ENCOUNTER — PATIENT OUTREACH (OUTPATIENT)
Dept: OTHER | Age: 65
End: 2020-04-09

## 2020-04-09 NOTE — PROGRESS NOTES
Third attempt to reach patient for [facility] Program, and discharge assessment. Discreet VM left. UTR letter sent on 4/6.

## 2020-05-06 ENCOUNTER — DOCUMENTATION ONLY (OUTPATIENT)
Dept: OTHER | Age: 65
End: 2020-05-06

## 2020-05-06 NOTE — PROGRESS NOTES
Resolving current episode for case management/RUTHY due to patient unable to reach. Patient has not been reached after repeated calls and letters. Letter sent to patient notifying completion of services due to unable to reach. This writer's contact information and information regarding program services included in materials sent.

## 2020-05-11 ENCOUNTER — PATIENT OUTREACH (OUTPATIENT)
Dept: OTHER | Age: 65
End: 2020-05-11

## 2020-05-11 NOTE — PROGRESS NOTES
Patient on [facility] discharge report dated 5/11. Left message on voicemail. Will attempt to contact again. Need to complete post-discharge assessment. Patient admitted on 3/9 and discharged/transferred to another facility 4/17 for traumatic subdural hemorrhage w/out loss of consciousness, initial encounter.'    Will attempt 2nd outreach on 5/12.

## 2020-05-12 ENCOUNTER — PATIENT OUTREACH (OUTPATIENT)
Dept: OTHER | Age: 65
End: 2020-05-12

## 2020-05-12 NOTE — LETTER
5/12/2020 11:43 AM 
 
Mr. Rae Solis [address] [place] [address] Dear Elena Mario Ulloa, My name is Carlos Lawton, Associate Care Manager for [facility], and I have been trying to reach you. The Associate Care  is a free-of-charge, confidential service provided to our associates and their family members covered by the [facility]. Part of my job is to follow up with members who have recently been in the hospital or emergency room, to help them coordinate their care and answer questions they may have about their visit. I am able to provide assistance with medication questions, scheduling needed follow-up appointments, and arranging services like home health or home medical equipment. I can also provide education regarding your hospital or ER visit as well as your medical conditions. As healthcare providers, we know that patients do better when they have close follow up with a primary care provider (PCP), especially after a hospital or emergency department visit. If you do not have a PCP, I can help you find one that is convenient to you and covered by your insurance. I can also help you understand any after visit instructions, such as what symptoms to watch out for, or any new or changed medications. Remember that you can access your After Visit Summary by logging into your [employer] account. If you do not have a [employer] account, I can help you request access. Our program is designed to provide you with the opportunity to have a [facility] FOR CHILDREN partner with you for your healthcare needs. Please contact me at the below number if I can provide you with assistance for any of the above services. Sincerely, SLIM Carr  Associate Care Manager [address] [place], [place] [address]  [phone]  F [phone]  [email or web address] 
 [facility] email: [email or web address]

## 2020-05-20 ENCOUNTER — HOSPITAL ENCOUNTER (EMERGENCY)
Age: 65
Discharge: ANOTHER HOSPITAL | End: 2020-05-21
Attending: EMERGENCY MEDICINE | Admitting: EMERGENCY MEDICINE
Payer: COMMERCIAL

## 2020-05-20 DIAGNOSIS — A41.9 SEPSIS, DUE TO UNSPECIFIED ORGANISM, UNSPECIFIED WHETHER ACUTE ORGAN DYSFUNCTION PRESENT (HCC): Primary | ICD-10-CM

## 2020-05-20 PROCEDURE — 99285 EMERGENCY DEPT VISIT HI MDM: CPT

## 2020-05-20 PROCEDURE — 96367 TX/PROPH/DG ADDL SEQ IV INF: CPT

## 2020-05-20 PROCEDURE — 96361 HYDRATE IV INFUSION ADD-ON: CPT

## 2020-05-20 PROCEDURE — 74011250636 HC RX REV CODE- 250/636: Performed by: EMERGENCY MEDICINE

## 2020-05-20 PROCEDURE — 96365 THER/PROPH/DIAG IV INF INIT: CPT

## 2020-05-20 PROCEDURE — 87040 BLOOD CULTURE FOR BACTERIA: CPT

## 2020-05-20 PROCEDURE — 96366 THER/PROPH/DIAG IV INF ADDON: CPT

## 2020-05-20 RX ORDER — VANCOMYCIN 1.75 GRAM/500 ML IN 0.9 % SODIUM CHLORIDE INTRAVENOUS
1750 ONCE
Status: COMPLETED | OUTPATIENT
Start: 2020-05-21 | End: 2020-05-21

## 2020-05-20 RX ORDER — LEVOFLOXACIN 5 MG/ML
750 INJECTION, SOLUTION INTRAVENOUS EVERY 24 HOURS
Status: DISCONTINUED | OUTPATIENT
Start: 2020-05-20 | End: 2020-05-21 | Stop reason: HOSPADM

## 2020-05-20 RX ORDER — SODIUM CHLORIDE 0.9 % (FLUSH) 0.9 %
5-10 SYRINGE (ML) INJECTION AS NEEDED
Status: DISCONTINUED | OUTPATIENT
Start: 2020-05-20 | End: 2020-05-21 | Stop reason: HOSPADM

## 2020-05-20 RX ADMIN — LEVOFLOXACIN 750 MG: 750 INJECTION, SOLUTION INTRAVENOUS at 23:58

## 2020-05-21 ENCOUNTER — APPOINTMENT (OUTPATIENT)
Dept: GENERAL RADIOLOGY | Age: 65
End: 2020-05-21
Attending: EMERGENCY MEDICINE
Payer: COMMERCIAL

## 2020-05-21 ENCOUNTER — APPOINTMENT (OUTPATIENT)
Dept: CT IMAGING | Age: 65
End: 2020-05-21
Attending: EMERGENCY MEDICINE
Payer: COMMERCIAL

## 2020-05-21 VITALS
WEIGHT: 198.41 LBS | HEART RATE: 115 BPM | SYSTOLIC BLOOD PRESSURE: 119 MMHG | OXYGEN SATURATION: 98 % | RESPIRATION RATE: 36 BRPM | TEMPERATURE: 101.8 F | DIASTOLIC BLOOD PRESSURE: 58 MMHG | BODY MASS INDEX: 30.17 KG/M2

## 2020-05-21 LAB
ALBUMIN SERPL-MCNC: 3.6 G/DL (ref 3.4–5)
ALBUMIN/GLOB SERPL: 0.7 {RATIO} (ref 0.8–1.7)
ALP SERPL-CCNC: 130 U/L (ref 45–117)
ALT SERPL-CCNC: 25 U/L (ref 16–61)
ANION GAP SERPL CALC-SCNC: 6 MMOL/L (ref 3–18)
APTT PPP: 29.5 SEC (ref 23–36.4)
AST SERPL-CCNC: 14 U/L (ref 10–38)
ATRIAL RATE: 127 BPM
BASOPHILS # BLD: 0 K/UL (ref 0–0.06)
BASOPHILS NFR BLD: 0 % (ref 0–3)
BILIRUB SERPL-MCNC: 0.3 MG/DL (ref 0.2–1)
BUN SERPL-MCNC: 13 MG/DL (ref 7–18)
BUN/CREAT SERPL: 12 (ref 12–20)
CALCIUM SERPL-MCNC: 8.9 MG/DL (ref 8.5–10.1)
CALCULATED P AXIS, ECG09: 61 DEGREES
CALCULATED R AXIS, ECG10: -50 DEGREES
CALCULATED T AXIS, ECG11: 85 DEGREES
CHLORIDE SERPL-SCNC: 103 MMOL/L (ref 100–111)
CO2 SERPL-SCNC: 28 MMOL/L (ref 21–32)
CREAT SERPL-MCNC: 1.1 MG/DL (ref 0.6–1.3)
CRP SERPL-MCNC: 0.7 MG/DL (ref 0–0.3)
DIAGNOSIS, 93000: NORMAL
DIFFERENTIAL METHOD BLD: ABNORMAL
EOSINOPHIL # BLD: 0.2 K/UL (ref 0–0.4)
EOSINOPHIL NFR BLD: 1 % (ref 0–5)
ERYTHROCYTE [DISTWIDTH] IN BLOOD BY AUTOMATED COUNT: 15.1 % (ref 11.6–14.5)
ERYTHROCYTE [SEDIMENTATION RATE] IN BLOOD: 56 MM/HR (ref 0–20)
FERRITIN SERPL-MCNC: 240 NG/ML (ref 8–388)
FIBRINOGEN PPP-MCNC: 439 MG/DL (ref 210–451)
GLOBULIN SER CALC-MCNC: 5 G/DL (ref 2–4)
GLUCOSE SERPL-MCNC: 185 MG/DL (ref 74–99)
HCT VFR BLD AUTO: 36.3 % (ref 36–48)
HGB BLD-MCNC: 11.8 G/DL (ref 13–16)
INR PPP: 1.1 (ref 0.8–1.2)
LACTATE SERPL-SCNC: 2.5 MMOL/L (ref 0.4–2)
LDH SERPL L TO P-CCNC: 187 U/L (ref 81–234)
LYMPHOCYTES # BLD: 0.5 K/UL (ref 0.8–3.5)
LYMPHOCYTES NFR BLD: 3 % (ref 20–51)
MCH RBC QN AUTO: 29.6 PG (ref 24–34)
MCHC RBC AUTO-ENTMCNC: 32.5 G/DL (ref 31–37)
MCV RBC AUTO: 91 FL (ref 74–97)
MONOCYTES # BLD: 0.7 K/UL (ref 0–1)
MONOCYTES NFR BLD: 4 % (ref 2–9)
NEUTS SEG # BLD: 16.2 K/UL (ref 1.8–8)
NEUTS SEG NFR BLD: 92 % (ref 42–75)
P-R INTERVAL, ECG05: 154 MS
PLATELET # BLD AUTO: 299 K/UL (ref 135–420)
PLATELET COMMENTS,PCOM: ABNORMAL
PMV BLD AUTO: 9.3 FL (ref 9.2–11.8)
POTASSIUM SERPL-SCNC: 4.1 MMOL/L (ref 3.5–5.5)
PROCALCITONIN SERPL-MCNC: <0.05 NG/ML
PROT SERPL-MCNC: 8.6 G/DL (ref 6.4–8.2)
PROTHROMBIN TIME: 14.2 SEC (ref 11.5–15.2)
Q-T INTERVAL, ECG07: 294 MS
QRS DURATION, ECG06: 82 MS
QTC CALCULATION (BEZET), ECG08: 427 MS
RBC # BLD AUTO: 3.99 M/UL (ref 4.7–5.5)
RBC MORPH BLD: ABNORMAL
RBC MORPH BLD: ABNORMAL
SODIUM SERPL-SCNC: 137 MMOL/L (ref 136–145)
TROPONIN I SERPL-MCNC: <0.02 NG/ML (ref 0–0.04)
VENTRICULAR RATE, ECG03: 127 BPM
WBC # BLD AUTO: 17.6 K/UL (ref 4.6–13.2)

## 2020-05-21 PROCEDURE — 74011250637 HC RX REV CODE- 250/637: Performed by: EMERGENCY MEDICINE

## 2020-05-21 PROCEDURE — 85025 COMPLETE CBC W/AUTO DIFF WBC: CPT

## 2020-05-21 PROCEDURE — 85610 PROTHROMBIN TIME: CPT

## 2020-05-21 PROCEDURE — 73560 X-RAY EXAM OF KNEE 1 OR 2: CPT

## 2020-05-21 PROCEDURE — 80053 COMPREHEN METABOLIC PANEL: CPT

## 2020-05-21 PROCEDURE — 93005 ELECTROCARDIOGRAM TRACING: CPT

## 2020-05-21 PROCEDURE — 84145 PROCALCITONIN (PCT): CPT

## 2020-05-21 PROCEDURE — 85384 FIBRINOGEN ACTIVITY: CPT

## 2020-05-21 PROCEDURE — 87040 BLOOD CULTURE FOR BACTERIA: CPT

## 2020-05-21 PROCEDURE — 74011250636 HC RX REV CODE- 250/636: Performed by: EMERGENCY MEDICINE

## 2020-05-21 PROCEDURE — 74011000258 HC RX REV CODE- 258: Performed by: EMERGENCY MEDICINE

## 2020-05-21 PROCEDURE — 70450 CT HEAD/BRAIN W/O DYE: CPT

## 2020-05-21 PROCEDURE — 71045 X-RAY EXAM CHEST 1 VIEW: CPT

## 2020-05-21 PROCEDURE — 85652 RBC SED RATE AUTOMATED: CPT

## 2020-05-21 PROCEDURE — 86140 C-REACTIVE PROTEIN: CPT

## 2020-05-21 PROCEDURE — 85730 THROMBOPLASTIN TIME PARTIAL: CPT

## 2020-05-21 PROCEDURE — 83615 LACTATE (LD) (LDH) ENZYME: CPT

## 2020-05-21 PROCEDURE — 83605 ASSAY OF LACTIC ACID: CPT

## 2020-05-21 PROCEDURE — 82728 ASSAY OF FERRITIN: CPT

## 2020-05-21 PROCEDURE — 84484 ASSAY OF TROPONIN QUANT: CPT

## 2020-05-21 RX ORDER — RAMELTEON 8 MG/1
8 TABLET ORAL
COMMUNITY
Start: 2020-05-15

## 2020-05-21 RX ORDER — TRAZODONE HYDROCHLORIDE 50 MG/1
25 TABLET ORAL
COMMUNITY
Start: 2020-05-15 | End: 2020-06-14

## 2020-05-21 RX ORDER — OLANZAPINE 7.5 MG/1
7.5 TABLET ORAL
COMMUNITY
Start: 2020-05-15 | End: 2020-06-14

## 2020-05-21 RX ORDER — ACETAMINOPHEN 500 MG
1000 TABLET ORAL
Status: COMPLETED | OUTPATIENT
Start: 2020-05-21 | End: 2020-05-21

## 2020-05-21 RX ORDER — OXYCODONE HYDROCHLORIDE 5 MG/1
2.5 TABLET ORAL
COMMUNITY
Start: 2020-05-15

## 2020-05-21 RX ORDER — LANOLIN ALCOHOL/MO/W.PET/CERES
3 CREAM (GRAM) TOPICAL
COMMUNITY
Start: 2020-05-15 | End: 2020-06-14

## 2020-05-21 RX ORDER — ASCORBIC ACID 250 MG
250 TABLET ORAL DAILY
COMMUNITY
Start: 2020-05-15 | End: 2020-06-14

## 2020-05-21 RX ORDER — LANOLIN ALCOHOL/MO/W.PET/CERES
325 CREAM (GRAM) TOPICAL DAILY
COMMUNITY
Start: 2020-05-16 | End: 2020-06-15

## 2020-05-21 RX ORDER — AMLODIPINE BESYLATE 10 MG/1
10 TABLET ORAL DAILY
COMMUNITY
Start: 2020-05-16 | End: 2020-06-15

## 2020-05-21 RX ORDER — PREGABALIN 25 MG/1
25 CAPSULE ORAL 2 TIMES DAILY
COMMUNITY
Start: 2020-05-15 | End: 2020-06-14

## 2020-05-21 RX ORDER — LEVETIRACETAM 1000 MG/1
1500 TABLET ORAL EVERY 12 HOURS
COMMUNITY
Start: 2020-05-15 | End: 2020-06-14

## 2020-05-21 RX ORDER — DOCUSATE SODIUM 100 MG/1
100 CAPSULE, LIQUID FILLED ORAL DAILY
COMMUNITY
Start: 2020-05-16 | End: 2020-06-15

## 2020-05-21 RX ADMIN — PIPERACILLIN AND TAZOBACTAM 3.38 G: 3; .375 INJECTION, POWDER, FOR SOLUTION INTRAVENOUS at 00:31

## 2020-05-21 RX ADMIN — VANCOMYCIN HYDROCHLORIDE 1750 MG: 10 INJECTION, POWDER, LYOPHILIZED, FOR SOLUTION INTRAVENOUS at 01:00

## 2020-05-21 RX ADMIN — SODIUM CHLORIDE, SODIUM LACTATE, POTASSIUM CHLORIDE, AND CALCIUM CHLORIDE 1000 ML: 600; 310; 30; 20 INJECTION, SOLUTION INTRAVENOUS at 00:28

## 2020-05-21 RX ADMIN — SODIUM CHLORIDE, SODIUM LACTATE, POTASSIUM CHLORIDE, AND CALCIUM CHLORIDE 1000 ML: 600; 310; 30; 20 INJECTION, SOLUTION INTRAVENOUS at 00:23

## 2020-05-21 RX ADMIN — ACETAMINOPHEN 1000 MG: 500 TABLET ORAL at 01:39

## 2020-05-21 NOTE — ED PROVIDER NOTES
EMERGENCY DEPARTMENT HISTORY AND PHYSICAL EXAM    2:43 AM  Date: 5/20/2020  Patient Name: Freedom Alcantara    History of Presenting Illness     Chief Complaint   Patient presents with    Altered mental status        History Provided By: EMS    HPI: Freedom Alcantara is a 76 y.o. male with history of multiple medical problems as below including recent TBI and tibial fracture status post internal fixation. Patient was discharged 5 days ago from [facility] under the trauma care service. He had a craniotomy for his TBI. His wife noted today that he had change in his mental status and was unable to get up and walk and has been walking with a walker recently. She checked his temperature and it was 102. Patient is unable to provide me with any history due to altered mental status. Location:  Severity:  Timing/course: Onset/Duration:     PCP: Avila Irvin MD    Past History     Past Medical History:  Past Medical History:   Diagnosis Date    Allergic rhinitis     Atherosclerotic PVD with intermittent claudication ([address]     Chest pain, unspecified     Diabetes mellitus type 2, uncontrolled, without complications ([address]     Essential hypertension, benign     Hyperlipidemia     Phimosis/redundant prepuce     Positive PPD     BCG related    Vitamin D deficiency        Past Surgical History:  Past Surgical History:   Procedure Laterality Date    HX UROLOGICAL  03/31/2017    tonkin, snlyudmila, Circumcision.        Family History:  Family History   Problem Relation Age of Onset    Heart Disease Mother        Social History:  Social History     Tobacco Use    Smoking status: Never Smoker    Smokeless tobacco: Never Used   Substance Use Topics    Alcohol use: Yes     Comment: occasionally    Drug use: No       Allergies:  No Known Allergies    Review of Systems   Review of Systems   Unable to perform ROS: Mental status change        Physical Exam     Patient Vitals for the past 12 hrs:   Temp Pulse Resp BP SpO2   05/21/20 0200  (!) 128 20 142/63 99 %   05/21/20 0008 (!) 102.9 °F (39.4 °C) (!) 130 (!) 35 151/75 95 %       Physical Exam  Vitals signs and nursing note reviewed. Constitutional:       Appearance: He is ill-appearing. HENT:      Head: Normocephalic. Eyes:      Extraocular Movements: Extraocular movements intact. Pupils: Pupils are equal, round, and reactive to light. Neck:      Musculoskeletal: Neck supple. Cardiovascular:      Rate and Rhythm: Tachycardia present. Pulmonary:      Effort: Pulmonary effort is normal. No respiratory distress. Musculoskeletal:         General: Swelling present. Left knee: He exhibits swelling, effusion and erythema. Tenderness found. Skin:     General: Skin is warm and dry. Neurological:      Mental Status: He is lethargic and disoriented.          Diagnostic Study Results     Labs -  Recent Results (from the past 12 hour(s))   EKG, 12 LEAD, INITIAL    Collection Time: 05/21/20 12:01 AM   Result Value Ref Range    Ventricular Rate 127 BPM    Atrial Rate 127 BPM    P-R Interval 154 ms    QRS Duration 82 ms    Q-T Interval 294 ms    QTC Calculation (Bezet) 427 ms    Calculated P Axis 61 degrees    Calculated R Axis -50 degrees    Calculated T Axis 85 degrees    Diagnosis       Sinus tachycardia  Left anterior fascicular block  Left ventricular hypertrophy with repolarization abnormality  Abnormal ECG  When compared with ECG of 23-JUL-2019 15:11,  No significant change was found     LACTIC ACID    Collection Time: 05/21/20 12:03 AM   Result Value Ref Range    Lactic acid 2.5 (HH) 0.4 - 2.0 MMOL/L   METABOLIC PANEL, COMPREHENSIVE    Collection Time: 05/21/20 12:03 AM   Result Value Ref Range    Sodium 137 136 - 145 mmol/L    Potassium 4.1 3.5 - 5.5 mmol/L    Chloride 103 100 - 111 mmol/L    CO2 28 21 - 32 mmol/L    Anion gap 6 3.0 - 18 mmol/L    Glucose 185 (H) 74 - 99 mg/dL    BUN 13 7.0 - 18 MG/DL    Creatinine 1.10 0.6 - 1.3 MG/DL    BUN/Creatinine ratio 12 12 - 20      GFR est AA >60 >60 ml/min/1.73m2    GFR est non-AA >60 >60 ml/min/1.73m2    Calcium 8.9 8.5 - 10.1 MG/DL    Bilirubin, total 0.3 0.2 - 1.0 MG/DL    ALT (SGPT) 25 16 - 61 U/L    AST (SGOT) 14 10 - 38 U/L    Alk. phosphatase 130 (H) 45 - 117 U/L    Protein, total 8.6 (H) 6.4 - 8.2 g/dL    Albumin 3.6 3.4 - 5.0 g/dL    Globulin 5.0 (H) 2.0 - 4.0 g/dL    A-G Ratio 0.7 (L) 0.8 - 1.7     CBC WITH AUTOMATED DIFF    Collection Time: 05/21/20 12:03 AM   Result Value Ref Range    WBC 17.6 (H) 4.6 - 13.2 K/uL    RBC 3.99 (L) 4.70 - 5.50 M/uL    HGB 11.8 (L) 13.0 - 16.0 g/dL    HCT 36.3 36.0 - 48.0 %    MCV 91.0 74.0 - 97.0 FL    MCH 29.6 24.0 - 34.0 PG    MCHC 32.5 31.0 - 37.0 g/dL    RDW 15.1 (H) 11.6 - 14.5 %    PLATELET 506 184 - 823 K/uL    MPV 9.3 9.2 - 11.8 FL    NEUTROPHILS 92 (H) 42 - 75 %    LYMPHOCYTES 3 (L) 20 - 51 %    MONOCYTES 4 2 - 9 %    EOSINOPHILS 1 0 - 5 %    BASOPHILS 0 0 - 3 %    ABS. NEUTROPHILS 16.2 (H) 1.8 - 8.0 K/UL    ABS. LYMPHOCYTES 0.5 (L) 0.8 - 3.5 K/UL    ABS. MONOCYTES 0.7 0 - 1.0 K/UL    ABS. EOSINOPHILS 0.2 0.0 - 0.4 K/UL    ABS.  BASOPHILS 0.0 0.0 - 0.06 K/UL    DF MANUAL      PLATELET COMMENTS ADEQUATE PLATELETS      RBC COMMENTS HYPOCHROMIA  1+        RBC COMMENTS POLYCHROMASIA  1+       PTT    Collection Time: 05/21/20 12:03 AM   Result Value Ref Range    aPTT 29.5 23.0 - 36.4 SEC   PROTHROMBIN TIME + INR    Collection Time: 05/21/20 12:03 AM   Result Value Ref Range    Prothrombin time 14.2 11.5 - 15.2 sec    INR 1.1 0.8 - 1.2     FIBRINOGEN    Collection Time: 05/21/20 12:03 AM   Result Value Ref Range    Fibrinogen 439 210 - 451 mg/dL   C REACTIVE PROTEIN, QT    Collection Time: 05/21/20 12:03 AM   Result Value Ref Range    C-Reactive protein 0.7 (H) 0 - 0.3 mg/dL   LD    Collection Time: 05/21/20 12:03 AM   Result Value Ref Range     81 - 234 U/L   FERRITIN    Collection Time: 05/21/20 12:03 AM   Result Value Ref Range    Ferritin 240 8 - 388 NG/ML   TROPONIN I    Collection Time: 05/21/20 12:03 AM   Result Value Ref Range    Troponin-I, QT <0.02 0.0 - 0.045 NG/ML   SED RATE (ESR)    Collection Time: 05/21/20 12:03 AM   Result Value Ref Range    Sed rate, automated 56 (H) 0 - 20 mm/hr       Radiologic Studies -   Ct Head Wo Cont    Result Date: 5/21/2020  IMPRESSION: 1. Postsurgical changes of left craniotomy. -Residual hypoattenuation/encephalomalacia bifrontal lobes and left temporal lobe, sites of prior hemorrhages. -Persistent left dural thickening with trace low-density extra-axial fluid measuring 4 mm in thickness 2. Interval increase of soft tissue induration at the left frontotemporal scalp. -Possible small focal area of fluid overlying craniotomy defect at the squamous portion of the left temporal bone, uncertain if this is residual fluid from postsurgical change or developing abscess. 3. No convincing evidence for new hemorrhage or infarct. Medical Decision Making     ED Course: Progress Notes, Reevaluation, and Consults:    2:43 AM Initial assessment performed. The patients presenting problems have been discussed, and they/their family are in agreement with the care plan formulated and outlined with them. I have encouraged them to ask questions as they arise throughout their visit. 3:36 AM  I called the patient's wife and updated her about the transfer and the results. Provider Notes (Medical Decision Making): 29-year-old male with multiple medical problems and recent craniotomy status post TBI and left tibial internal fixation. Patient was brought in by EMS for acute change in mental status as well as fever. He is ill-appearing on exam and tachycardic and febrile. Unable to answer questions due to confusion but he is able to protect his airway and is able to follow commands. No focal weakness. Craniotomy scars appears dry and intact and healing well. Left lower extremity including the knee is extremely hot to touch and swollen.   Suspect this might be the source of infection. Sepsis bundle was initiated including broad-spectrum antibiotics. Patient is maintaining stable blood pressure. I will also get a stat head CT to evaluate for rebleeding versus abscess. I contacted [facility] trauma service for transfer since the patient had multiple surgeries there and would benefit from continued of care. Trauma surgeon Dr. Adalberto Gaucher is on board with the plan, patient will be transferred to the ER and evaluated by the trauma team there. Spoke to Dr. Jace Ferguson and he accepted the patient. Stable for transfer. Procedures:     Critical Care Time: Upon my evaluation, this patient had a high probability of imminent or life-threatening deterioration due to sepsis and altered mental status, which required my direct attention, intervention, and personal management. I have personally provided 60 minutes of critical care time exclusive of time spent on separately billable procedures. Time includes review of laboratory data, radiology results, discussion with consultants, and monitoring for potential decompensation. Interventions were performed as documented above. Eri Young MD  2:48 AM        Vital Signs-Reviewed the patient's vital signs. Reviewed pt's pulse ox reading. EKG: Interpreted by the EP. Time Interpreted:    Rate:    Rhythm:    Interpretation:   Comparison:     Records Reviewed: Nursing Notes (Time of Review: 2:43 AM)  -I am the first provider for this patient.  -I reviewed the vital signs, available nursing notes, past medical history, past surgical history, family history and social history.     Current Facility-Administered Medications   Medication Dose Route Frequency Provider Last Rate Last Dose    VANCOMYCIN INFORMATION NOTE   Other Rx Dosing/Monitoring Eri Young MD        sodium chloride (NS) flush 5-10 mL  5-10 mL IntraVENous PRN Eri Young MD        piperacillin-tazobactam (ZOSYN) 3.375 g in 0.9% sodium chloride (MBP/ADV) 100 mL MBP  3.375 g IntraVENous Q6H Eri Young MD        vancomycin (VANCOCIN) 1750 mg in  ml infusion  1,750 mg IntraVENous ONCE Eri Young  mL/hr at 05/21/20 0100 1,750 mg at 05/21/20 0100    levoFLOXacin (LEVAQUIN) 750 mg in D5W IVPB  750 mg IntraVENous Q24H Eri Young  mL/hr at 05/20/20 2358 750 mg at 05/20/20 2358    lactated ringers bolus infusion 700 mL  700 mL IntraVENous ONCE Eri Young MD         Current Outpatient Medications   Medication Sig Dispense Refill    amLODIPine (NORVASC) 10 mg tablet Take 10 mg by mouth daily.  ascorbic acid, vitamin C, (VITAMIN C) 250 mg tablet Take 250 mg by mouth daily.  docusate sodium (COLACE) 100 mg capsule Take 100 mg by mouth daily.  ferrous sulfate 325 mg (65 mg iron) tablet Take 325 mg by mouth daily.  levETIRAcetam 1,000 mg tablet Take 1,500 mg by mouth every twelve (12) hours.  therapeutic multivitamin-minerals (THERAGRAN-M) tablet Take 1 Tab by mouth daily.  melatonin 3 mg tablet Take 3 mg by mouth.  OLANZapine (ZyPREXA) 7.5 mg tablet Take 7.5 mg by mouth nightly.  oxyCODONE IR (ROXICODONE) 5 mg immediate release tablet Take 2.5 mg by mouth every four (4) hours as needed.  pregabalin (LYRICA) 25 mg capsule Take 25 mg by mouth two (2) times a day.  ramelteon (ROZEREM) 8 mg tablet Take 8 mg by mouth nightly.  traZODone (DESYREL) 50 mg tablet Take 25 mg by mouth nightly.  VITAMIN D2 50,000 unit capsule 50,000 Units every seven (7) days. 0    ibuprofen (MOTRIN) 800 mg tablet   0    LEVEMIR FLEXTOUCH 100 unit/mL (3 mL) inpn 4 Units daily. 0    folic acid-vit E6-HMS G69 2.5-25-1 mg tablet Take 1 Tab by mouth daily.  insulin lispro (HUMALOG) 100 unit/mL kwikpen 6 Units by SubCUTAneous route three (3) times daily. Clinical Impression     Clinical Impression:   1.  Sepsis, due to unspecified organism, unspecified whether acute organ dysfunction present [facility])        Disposition: Transfer        This note was dictated utilizing voice recognition software which may lead to typographical errors. I apologize in advance if the situation occurs. If questions arise please do not hesitate to contact me or call our department.     Sai Nolan MD  2:43 AM

## 2020-05-21 NOTE — ED TRIAGE NOTES
Pt arrives via EMS sp craniotomy and was dc'd 4 days ago. Pt is febril on arrival, altered, ST on monitor. LLE hot to touch.   Pt is altered and shivvering on arrival

## 2020-05-27 ENCOUNTER — PATIENT OUTREACH (OUTPATIENT)
Dept: OTHER | Age: 65
End: 2020-05-27

## 2020-05-27 LAB
BACTERIA SPEC CULT: NORMAL
BACTERIA SPEC CULT: NORMAL
SERVICE CMNT-IMP: NORMAL
SERVICE CMNT-IMP: NORMAL

## 2020-05-27 NOTE — PROGRESS NOTES
Spoke with wife, Francetta Favre. States she needs assistance with an  to discuss legal issues. States her  did \"everything\", bills, yard work, put gas in her car. Referred to [employer]. Patient also had questions regarding leave. Provided her with the number for Integrated Leave Team.    Discussed patient's condition. She states he remains at [facility] with plans to have another craniotomy today. Continues to have a fever, cultures and labs are pending. Will call her back in one week, 6/3. Let her know she can call me if she needs anything. Mailed my card to her.

## 2020-06-03 ENCOUNTER — PATIENT OUTREACH (OUTPATIENT)
Dept: OTHER | Age: 65
End: 2020-06-03

## 2020-06-03 NOTE — PROGRESS NOTES
Attempt to reach patient for follow up. Discreet VM left with contact information. Will try back in one week, 6/10.

## 2020-06-22 ENCOUNTER — PATIENT OUTREACH (OUTPATIENT)
Dept: OTHER | Age: 65
End: 2020-06-22

## 2020-06-22 NOTE — PROGRESS NOTES
Briefly spoke with [place], patient's wife. Patient is still at a skilled facility. She has called [employer] and was working with a . She was told the costs would be 3,000 dollars for help with financial matters. States her  always managed finances for the household. She is trying to get a guardianship. Will refer to [employer], . States she is off on Tuesday and Thursdays.   Best phone number is [phone]

## 2020-06-22 NOTE — Clinical Note
Ranulfo Harper you may know of some way to help. See my last progress note. Let me know if you have any questions. Thank you!

## 2020-06-25 ENCOUNTER — PATIENT OUTREACH (OUTPATIENT)
Dept: OTHER | Age: 65
End: 2020-06-25

## 2020-06-25 NOTE — PROGRESS NOTES
MSW STELLA contacted patient wife. Patient identifiers confirmed, zip code and . Patient wife concerns  Mrs. Kelly Holman is having difficulty managing family finances because she does not have POA or guardianship of her . Background information according to Mrs. Kelly Holman is currently hospitalized. He is slated to be admitted to a rehab facility following the hospitalization. Following the rehab stay, patient will be returning home as there are no resources in place to afford inpatient long term care. Mrs. Regine Caal stated that her  managed all of the family's finances. Since the hospitalization, Ms. Regine Caal stated that she has learned that their financial situation is \"all messed up. \"  There appears to be more expenses than income. Mrs. Regine Caal stated that she wants to sell the family home because it is not handicap accessible. In order to sell the home and conduct other business, she needs guardianship of her . Mrs. Regine Caal stated that she called [employer] who referred her to an  that will cost $3000 to complete guardianship documents. Mrs. Regine Caal stated that she does not have adequate income to afford that expense although she needs the service. Mrs. Wagner added that she will be forced to retire from her job because she would be her 's caregiver. Based off of family incomes, it does not appear that Mr. Regine Caal will qualify for Medicaid long term care. He also does not have LTC insurance. Plan of action   [facility] will provide Mrs. Wagner with additional elder law attorneys along with the process to initiate guardianship without an .         LTC    Selling a house  Apartment  No savings  He handled all finances  He needs to come back home  FMLA  He left a mess    Guardianship  Not able to make any decisions  Not able to articulate    Head injury  Cognitive issues    [employer] set them up with an , 20% reduction [address] [place] [address] is not handicap accessible  ,

## 2020-06-25 NOTE — PATIENT INSTRUCTIONS
Mrs. Nikia Estrada, Thank you for taking time to chat with me today. Below are attorneys available that may be able to assist you. Elder Law Attorneys 
[email or web address] 
[email or web address] 
[email or web address]

## 2020-07-01 ENCOUNTER — PATIENT OUTREACH (OUTPATIENT)
Dept: OTHER | Age: 65
End: 2020-07-01

## 2020-07-01 NOTE — PROGRESS NOTES
Follow up phone call to patient, two pt identifiers verified. Discussed patient's goals:   Goals        Patient Stated     Patient spouse will have access to elder law attorneys (pt-stated)      Patient spouse will be provided resources for Elder             Patient's primary care provider relationship reviewed with patient and modified, as applicable. Some days he seems to be coming around and other days not. May still have to get guardianship. End of August, assisted process. Readiness to Change: []  Pre-contemplative    []  Contemplative  []  Preparation               [x]  Action                  []  Maintenance    Barriers/Challenges to Care: []  Decline in memory    []  Language barrier     []  Emotional                  []  Limited mobility  []  Lack of motivation     [] Vision, hearing or cognitive impairment []  Knowledge [] Financial Barriers []  Lack of support  []  Pain []  Other [x]  None    Upcoming appointments: Patient still in hospital, waiting for placement to skilled care. Plan for next call: Will call back in two weeks, 7/15.

## 2020-07-02 ENCOUNTER — PATIENT OUTREACH (OUTPATIENT)
Dept: OTHER | Age: 65
End: 2020-07-02

## 2020-07-09 ENCOUNTER — PATIENT OUTREACH (OUTPATIENT)
Dept: OTHER | Age: 65
End: 2020-07-09

## 2020-07-09 NOTE — PROGRESS NOTES
Briefly spoke with patient's wife, Leona Vargas. She has a meeting with the  at [facility] at 10:30 today. Requested the contact information but she says she does not have the correct phone number. Says she will contact me later with this information. Patient continues his recovery at [facility]. Plans are for him to go to rehab facility. She discussed financial concerns with me, as well. States she is trying to get guardianship in place. Will likely have to sell her house and move to an apartment with handicap access. She is also planning to retire in the next few months. She has two daughters who are helping her out with financials and support. Will look forward to her call back today with contact information for the  at [facility]. Received message from Lam Benson regarding this patient. Inquired about a discharge plan. Sent her message back letting her know I am awaiting call from .

## 2020-07-10 ENCOUNTER — PATIENT OUTREACH (OUTPATIENT)
Dept: OTHER | Age: 65
End: 2020-07-10

## 2020-07-10 NOTE — PROGRESS NOTES
Spoke with patient's wife, Michael Branch. She provided the phone number for the Margot Abler [phone]. Outreach call to Bubba Dupree LCSW, left a message. M-TH work schedule.

## 2020-07-13 ENCOUNTER — PATIENT OUTREACH (OUTPATIENT)
Dept: OTHER | Age: 65
End: 2020-07-13

## 2020-07-13 NOTE — PROGRESS NOTES
Spoke with [place], patient's wife. Nursing home called [facility] in [place] has not returned calls from the , Padmini Pritchett. Asked that I call [facility] to ask about admission. Will ask for assistance from [facility], . Alex Pritchett,  [phone] at [facility]. She states that she has contacted facilities in [address] [place] [address] for placement, without any success. Spoke with Augusto Peres at [facility]. I explained the current situation. She states they are not taking new patients due to [place]. She told me I could check back in a few weeks    Email sent to [facility] nurse reviewer, Rosa Donohue to let her know this information about struggles with placement. Called Padmini Pritchett, Case Manger, let her know that they aren't accepting patients due to [place], she says this it the problem she is running into. She has called 12-13 facilities and had no luck with placement. Will continue to follow.       Will call wife back in one week, 7/20

## 2020-07-21 ENCOUNTER — PATIENT OUTREACH (OUTPATIENT)
Dept: OTHER | Age: 65
End: 2020-07-21

## 2020-07-21 NOTE — PROGRESS NOTES
Attempt to reach patient for follow up. Discreet VM left with contact information. Will call back in one week, 7/28.

## 2020-07-28 ENCOUNTER — PATIENT OUTREACH (OUTPATIENT)
Dept: OTHER | Age: 65
End: 2020-07-28

## 2020-07-28 NOTE — PROGRESS NOTES
Briefly spoke with [place], patient's wife. She is trying to get [facility] information from . She has been put on hold for lengthy amounts of time. Needs aggressive therapy. Let her know that [facility] is not accepting patients due to R[place]. Planning to retire end of September. Had questions about using her Sick Leave from her bank. Advised that HR would have to guide her on this. Provided support and encouragement.

## 2020-07-30 ENCOUNTER — PATIENT OUTREACH (OUTPATIENT)
Dept: OTHER | Age: 65
End: 2020-07-30

## 2020-07-30 NOTE — PROGRESS NOTES
Spoke with Lea Ibarra, she continues to call facilities, has expanded to outside- Miguel Angel, [facility], Agustin auguste, [facility] Farris - search expanded. Patient relies on his wife, refusing to take medicines and food if she is not available. His wife is trying to retire at the end of September. Email sent to Anne Marie Hughes at [facility] to inform her of this.

## 2020-08-11 ENCOUNTER — PATIENT OUTREACH (OUTPATIENT)
Dept: OTHER | Age: 65
End: 2020-08-11

## 2020-08-11 NOTE — PROGRESS NOTES
Spoke with patient's wife. She states her  has had a set back. Had fevers and had to be intubated. Grim prognosis. Negative for COVID. She is planning to have eye surgery on both eyes in September and will retire at the end of September if her  does not pass. She is trying to get a DNR, one daughter is okay with it, the other is not. Will call back in one week, 8/18.

## 2020-08-18 ENCOUNTER — PATIENT OUTREACH (OUTPATIENT)
Dept: OTHER | Age: 65
End: 2020-08-18

## 2020-08-18 NOTE — PROGRESS NOTES
Attempt to reach patient for follow up. Discreet VM left with contact information. Will try back in on week, 8/25.

## 2020-08-25 ENCOUNTER — PATIENT OUTREACH (OUTPATIENT)
Dept: OTHER | Age: 65
End: 2020-08-25

## 2020-08-25 NOTE — PROGRESS NOTES
Attempt to reach patient for follow up. Discreet VM left with contact information. Will call in two weeks, 9/8.

## 2020-09-08 ENCOUNTER — PATIENT OUTREACH (OUTPATIENT)
Dept: OTHER | Age: 65
End: 2020-09-08

## 2020-09-08 NOTE — PROGRESS NOTES
Spoke with [place]. States that her   on . Provided my condolences. She had her eye surgery and does plan to return to work. Let her know she can contact me should she have any needs. She was very appreciative for my support through the patients illness.

## 2022-08-19 NOTE — ED NOTES
Current Discharge Medication List      START taking these medications    Details   amoxicillin (AMOXIL) 250 mg capsule Take 1 Cap by mouth three (3) times daily for 10 days. Qty: 30 Cap, Refills: 0           Patient armband removed and shredded. Prescription given and review with patient. (2) well flexed